# Patient Record
Sex: FEMALE | Race: WHITE | Employment: PART TIME | URBAN - NONMETROPOLITAN AREA
[De-identification: names, ages, dates, MRNs, and addresses within clinical notes are randomized per-mention and may not be internally consistent; named-entity substitution may affect disease eponyms.]

---

## 2017-05-17 DIAGNOSIS — Z12.31 VISIT FOR SCREENING MAMMOGRAM: Primary | ICD-10-CM

## 2017-11-29 ENCOUNTER — HOSPITAL ENCOUNTER (EMERGENCY)
Facility: HOSPITAL | Age: 57
Discharge: HOME | End: 2017-11-29
Attending: FAMILY MEDICINE | Admitting: FAMILY MEDICINE
Payer: COMMERCIAL

## 2017-11-29 VITALS
OXYGEN SATURATION: 99 % | RESPIRATION RATE: 12 BRPM | DIASTOLIC BLOOD PRESSURE: 89 MMHG | TEMPERATURE: 98.7 F | HEIGHT: 62 IN | SYSTOLIC BLOOD PRESSURE: 141 MMHG

## 2017-11-29 DIAGNOSIS — R00.2 PALPITATIONS: ICD-10-CM

## 2017-11-29 LAB
ALBUMIN SERPL-MCNC: 4 G/DL (ref 3.4–5)
ALP SERPL-CCNC: 103 U/L (ref 40–150)
ALT SERPL W P-5'-P-CCNC: 23 U/L (ref 0–50)
ANION GAP SERPL CALCULATED.3IONS-SCNC: 10 MMOL/L (ref 3–14)
AST SERPL W P-5'-P-CCNC: 17 U/L (ref 0–45)
BASOPHILS # BLD AUTO: 0.1 10E9/L (ref 0–0.2)
BASOPHILS NFR BLD AUTO: 0.7 %
BILIRUB SERPL-MCNC: 0.4 MG/DL (ref 0.2–1.3)
BUN SERPL-MCNC: 7 MG/DL (ref 7–30)
CALCIUM SERPL-MCNC: 9.2 MG/DL (ref 8.5–10.1)
CHLORIDE SERPL-SCNC: 99 MMOL/L (ref 94–109)
CO2 SERPL-SCNC: 25 MMOL/L (ref 20–32)
CREAT SERPL-MCNC: 0.75 MG/DL (ref 0.52–1.04)
DIFFERENTIAL METHOD BLD: NORMAL
EOSINOPHIL # BLD AUTO: 0 10E9/L (ref 0–0.7)
EOSINOPHIL NFR BLD AUTO: 0.5 %
ERYTHROCYTE [DISTWIDTH] IN BLOOD BY AUTOMATED COUNT: 12 % (ref 10–15)
GFR SERPL CREATININE-BSD FRML MDRD: 80 ML/MIN/1.7M2
GLUCOSE SERPL-MCNC: 142 MG/DL (ref 70–99)
HCT VFR BLD AUTO: 39.7 % (ref 35–47)
HGB BLD-MCNC: 14.1 G/DL (ref 11.7–15.7)
IMM GRANULOCYTES # BLD: 0 10E9/L (ref 0–0.4)
IMM GRANULOCYTES NFR BLD: 0.3 %
LYMPHOCYTES # BLD AUTO: 1.8 10E9/L (ref 0.8–5.3)
LYMPHOCYTES NFR BLD AUTO: 24.4 %
MAGNESIUM SERPL-MCNC: 2.4 MG/DL (ref 1.6–2.3)
MCH RBC QN AUTO: 29.8 PG (ref 26.5–33)
MCHC RBC AUTO-ENTMCNC: 35.5 G/DL (ref 31.5–36.5)
MCV RBC AUTO: 84 FL (ref 78–100)
MONOCYTES # BLD AUTO: 0.4 10E9/L (ref 0–1.3)
MONOCYTES NFR BLD AUTO: 5.3 %
NEUTROPHILS # BLD AUTO: 5.1 10E9/L (ref 1.6–8.3)
NEUTROPHILS NFR BLD AUTO: 68.8 %
NRBC # BLD AUTO: 0 10*3/UL
NRBC BLD AUTO-RTO: 0 /100
PLATELET # BLD AUTO: 235 10E9/L (ref 150–450)
POTASSIUM SERPL-SCNC: 4 MMOL/L (ref 3.4–5.3)
PROT SERPL-MCNC: 6.9 G/DL (ref 6.8–8.8)
RBC # BLD AUTO: 4.73 10E12/L (ref 3.8–5.2)
SODIUM SERPL-SCNC: 134 MMOL/L (ref 133–144)
TROPONIN I SERPL-MCNC: <0.015 UG/L (ref 0–0.04)
TSH SERPL DL<=0.005 MIU/L-ACNC: 2.62 MU/L (ref 0.4–4)
WBC # BLD AUTO: 7.3 10E9/L (ref 4–11)

## 2017-11-29 PROCEDURE — 93005 ELECTROCARDIOGRAM TRACING: CPT

## 2017-11-29 PROCEDURE — 36415 COLL VENOUS BLD VENIPUNCTURE: CPT | Performed by: FAMILY MEDICINE

## 2017-11-29 PROCEDURE — 93010 ELECTROCARDIOGRAM REPORT: CPT | Performed by: INTERNAL MEDICINE

## 2017-11-29 PROCEDURE — 84443 ASSAY THYROID STIM HORMONE: CPT | Performed by: FAMILY MEDICINE

## 2017-11-29 PROCEDURE — 84484 ASSAY OF TROPONIN QUANT: CPT | Performed by: FAMILY MEDICINE

## 2017-11-29 PROCEDURE — 99285 EMERGENCY DEPT VISIT HI MDM: CPT | Performed by: FAMILY MEDICINE

## 2017-11-29 PROCEDURE — 80053 COMPREHEN METABOLIC PANEL: CPT | Performed by: FAMILY MEDICINE

## 2017-11-29 PROCEDURE — 83735 ASSAY OF MAGNESIUM: CPT | Performed by: FAMILY MEDICINE

## 2017-11-29 PROCEDURE — 85025 COMPLETE CBC W/AUTO DIFF WBC: CPT | Performed by: FAMILY MEDICINE

## 2017-11-29 PROCEDURE — 99284 EMERGENCY DEPT VISIT MOD MDM: CPT

## 2017-11-29 NOTE — ED AVS SNAPSHOT
[facility]    [address]    [place] [address]    Phone:  [phone]                                       Renu Curiel   MRN: [id number]    Department:  [facility]   Date of Visit:  11/29/2017           After Visit Summary Signature Page     I have received my discharge instructions, and my questions have been answered. I have discussed any challenges I see with this plan with the nurse or doctor.    ..........................................................................................................................................  Patient/Patient Representative Signature      ..........................................................................................................................................  Patient Representative Print Name and Relationship to Patient    ..................................................               ................................................  Date                                            Time    ..........................................................................................................................................  Reviewed by Signature/Title    ...................................................              ..............................................  Date                                                            Time

## 2017-11-29 NOTE — ED PROVIDER NOTES
"eMERGENCY dEPARTMENT eNCOUnter        CHIEF COMPLAINT    Chief Complaint   Patient presents with     Tachycardia     Srated at 1030 this am and last approx 10 minutes, \"then came back again when I got up to walk\".     Chest Pain     Left chest pain and jaw pain. \"jaw pain off and on for 2 weeks\". States was started on a new medication yesterday.       HPI    Renu Curiel is a 57 year old female who presents with rapid heart rate and \"feeling weird\" at work today.  She saw Dr. Montoya in clinic yesterday with some mid abdominal pain which has been chronic for her.  She also has a history of chronic left shoulder pain and chest pain.  She has had workup including stress test, MRI, etc.  She was given some bentyl, took it yesterday and today.  She started feeling \"weird\", feeling heavy.  Then heart starting going fast, she doesn't know how fast.  She sat down and rested, then got up to walk again and her heart started racing again.  She called EMS, pre-hospital EKG showed sinus rhythm.  She hands me a written list of \"things that have happened to me\"over the last 8 years.  These include symptoms of: 1. Weird wiggy feeling and strange feeling throughout body 2. Jittery and nervous 3. Anxious 4. Tired - heavy feeling 5. Tingling, digestive issues, and:  lower back pain, chest pain, aches all over, migraines, chills, warm at night, racing heart, swollen eyes, white bumps on hand and feet.  Evaluations include HIDA scans, abdominal ultrasounds, colonoscopies, vaginal US, MRI of head, MRI of heart, stress tests.    REVIEW OF SYSTEMS    Cardiac: no Chest Pain, No syncope, complains of left shoulder pain going up neck and down arm for a couple of years  Respiratory: No cough or hemoptysis  GI: No Vomiting or Diarrhea  : No Dysuria or Hematuria  General: No Fever or Chills  All other systems reviewed and are negative.    PAST MEDICAL & SURGICAL HISTORY    No past medical history on file.  No past surgical history on " "file.    CURRENT MEDICATIONS    Current Outpatient Rx   Medication Sig Dispense Refill     Levothyroxine Sodium (SYNTHROID PO) Take 75 mcg by mouth three times a week       Dicyclomine HCl (BENTYL PO) Take 10 mg by mouth 4 times daily       VITAMIN D, CHOLECALCIFEROL, PO Take 1,000 Units by mouth daily       Levothyroxine Sodium (SYNTHROID PO) Take 88 mcg by mouth four times a week          ALLERGIES    No Known Allergies    SOCIAL & FAMILY HISTORY    Social History     Social History     Marital status:      Spouse name: N/A     Number of children: N/A     Years of education: N/A     Social History Main Topics     Smoking status: Current Every Day Smoker     Packs/day: 0.50     Years: 40.00     Smokeless tobacco: Never Used     Alcohol use Yes     Drug use: No     Sexual activity: Not on file     Other Topics Concern     Not on file     Social History Narrative     No family history on file.    PHYSICAL EXAM    VITAL SIGNS: /89  Temp 98.7  F (37.1  C) (Oral)  Resp 12  Ht 1.575 m (5' 2\")  SpO2 99%   Constitutional:  Well developed, well nourished, no acute distress, she is wearing dark glasses.     HENT:  Atraumatic, moist mucus membranes  Neck: supple, no JVD   Respiratory:  Lungs Clear, no retractions   Cardiovascular:  regular rate, no murmurs  Vascular: Radial pulses 2+ equal bilaterally  GI:  Soft, nontender, normal bowel sounds  Musculoskeletal:  No edema, no acute deformities  Integument:  Skin warm and dry, no petechiae   Neurologic:  Alert & oriented, no slurred speech  Psych: Pleasant affect, no hallucinations    EKG    Interpreted by emergency department physician:  NSR, no ischemic changes        ED COURSE & MEDICAL DECISION MAKING    Pertinent Labs & Imaging studies reviewed and interpreted. (See chart for details)       See chart for details of medications given during the ED stay.    Vitals:    11/29/17 1315 11/29/17 1330 11/29/17 1345 11/29/17 1427   BP: 146/82 136/92 (!) 143/129 " 141/89   Resp:    12   Temp:    98.7  F (37.1  C)   TempSrc:    Oral   SpO2: 98% 100% 98% 99%   Height:             FINAL IMPRESSION    1. Palpitations        Plan: I discussed findings with the patient, she has a normal exam, normal labs, normal EKG.  I have recommended a cardiac monitor and follow up with Dr. Montoya.  She agreed to this but then refused the Zio Patch when they came to fit her.  Will have her follow up with Dr. Montoya, certainly we will need to capture this tachycardia.             Krysten Knutson MD  11/29/17 4898

## 2017-11-29 NOTE — ED AVS SNAPSHOT
" [facility]    [address] [place] [address]    Phone:  [phone]                                       Renu Curiel   MRN: [id number]    Department:  [facility]   Date of Visit:  11/29/2017           Patient Information     Date Of Birth          1960        Your diagnoses for this visit were:     Palpitations        You were seen by Krysten Knutson MD.        Discharge Instructions         * Heart Palpitations    Palpitations refers to the feeling that your heart is beating hard, fast or irregular. Some people describe it as \"pounding\" or \"skipped beats\". Palpitations may occur in persons with heart disease, but can also occur in healthy persons. Your doctor does not believe that anything dangerous is causing your symptoms at this time.  Heart-Related Causes:    Arrhythmia (a change from the heart's normal rhythm)    Disease of the heart valves  Non-Heart-Related Causes:    Certain medicines (such as asthma inhalers and decongestants)    Some herbal supplements, energy drinks and pills, and weight loss pills    Illegal stimulant drugs (such as cocaine, crank, methamphetamine, PCP)    Caffeine, alcohol and tobacco    Medical conditions such as thyroid disease, anemia, anxiety and panic disorder  Sometimes the cause cannot be found.  Home Care:  1. Avoid excess caffeine, alcohol, tobacco and any stimulant drugs.  2. Tell your doctor about any prescription or over-the-counter or herbal medicines you take.  Follow Up  with your doctor or as advised by our staff.  Get Prompt Medical Attention  if any of the following occur together with palpitations:    Weakness, dizziness, light-headed or fainting    Chest pain or shortness of breath    Rapid heart rate (over 120 beats per minute, at rest)    Palpitations that lasts over 20 minutes    Weakness of an arm or leg or one side of the face    Difficulty with speech or vision    8294-6217 [employer] [address] " [place] [address]. All rights reserved. This information is not intended as a substitute for professional medical care. Always follow your healthcare professional's instructions.  This information has been modified by your health care provider with permission from the publisher.          ED Discharge Orders     Zio Patch Holter                    Review of your medicines      Our records show that you are taking the medicines listed below. If these are incorrect, please call your family doctor or clinic.        Dose / Directions Last dose taken    BENTYL PO   Dose:  10 mg        Take 10 mg by mouth 4 times daily   Refills:  0        * SYNTHROID PO   Dose:  88 mcg        Take 88 mcg by mouth four times a week   Refills:  0        * SYNTHROID PO   Dose:  75 mcg        Take 75 mcg by mouth three times a week   Refills:  0        VITAMIN D (CHOLECALCIFEROL) PO   Dose:  1000 Units        Take 1,000 Units by mouth daily   Refills:  0        * Notice:  This list has 2 medication(s) that are the same as other medications prescribed for you. Read the directions carefully, and ask your doctor or other care provider to review them with you.            Procedures and tests performed during your visit     CBC with platelets differential    Comprehensive metabolic panel    EKG 12-lead, tracing only    Magnesium    TSH    Troponin I      Orders Needing Specimen Collection     None      Pending Results     No orders found from 11/27/2017 to 11/30/2017.            Pending Culture Results     No orders found from 11/27/2017 to 11/30/2017.            Thank you for choosing [facility]       Thank you for choosing [facility] for your care. Our goal is always to provide you with excellent care. Hearing back from our patients is one way we can continue to improve our services. Please take a few minutes to complete the written survey that you may receive in the mail after you visit with us. Thank you!        [facility]  "Information     [employer] lets you send messages to your doctor, view your test results, renew your prescriptions, schedule appointments and more. To sign up, go to [email or web address] . Click on \"Log in\" on the left side of the screen, which will take you to the Welcome page. Then click on \"Sign up Now\" on the right side of the page.     You will be asked to enter the access code listed below, as well as some personal information. Please follow the directions to create your username and password.     Your access code is: [email or web address]  Expires: 2018  2:06 PM     Your access code will  in 90 days. If you need help or a new code, please call your [facility] clinic or [phone].        Care EveryWhere ID     This is your Care EveryWhere ID. This could be used by other organizations to access your [facility] medical records  [id number]        Equal Access to Services     AFR[place] REESE AH: Hadii caden Warren, wanathaniel coker, qavenessa kaalmareema almonte, javon montoya . So [facility] [place] [phone].    ATENCIÓN: Si habla español, tiene a weaver disposición servicios gratuitos de asistencia lingüística. Llame al [phone].    We comply with applicable federal civil rights laws and [place] laws. We do not discriminate on the basis of race, color, national origin, age, disability, sex, sexual orientation, or gender identity.            After Visit Summary       This is your record. Keep this with you and show to your community pharmacist(s) and doctor(s) at your next visit.                  "

## 2017-11-29 NOTE — ED NOTES
"Pt brought in by [facility] ambulance. Pt with history of \"feeling like my heart was racing\" Stated episode was fallowed by left chest pain and left jaw pain. Pt was given 1 nitro in route with no change in pain level. Pt also took 4 baby aspirin. States has had off and ion jaw pain for 2 weeks, off and on nausea for 1 week. Denies any diaphoresis or vomiting.  "

## 2017-11-29 NOTE — DISCHARGE INSTRUCTIONS
"  * Heart Palpitations    Palpitations refers to the feeling that your heart is beating hard, fast or irregular. Some people describe it as \"pounding\" or \"skipped beats\". Palpitations may occur in persons with heart disease, but can also occur in healthy persons. Your doctor does not believe that anything dangerous is causing your symptoms at this time.  Heart-Related Causes:    Arrhythmia (a change from the heart's normal rhythm)    Disease of the heart valves  Non-Heart-Related Causes:    Certain medicines (such as asthma inhalers and decongestants)    Some herbal supplements, energy drinks and pills, and weight loss pills    Illegal stimulant drugs (such as cocaine, crank, methamphetamine, PCP)    Caffeine, alcohol and tobacco    Medical conditions such as thyroid disease, anemia, anxiety and panic disorder  Sometimes the cause cannot be found.  Home Care:  1. Avoid excess caffeine, alcohol, tobacco and any stimulant drugs.  2. Tell your doctor about any prescription or over-the-counter or herbal medicines you take.  Follow Up  with your doctor or as advised by our staff.  Get Prompt Medical Attention  if any of the following occur together with palpitations:    Weakness, dizziness, light-headed or fainting    Chest pain or shortness of breath    Rapid heart rate (over 120 beats per minute, at rest)    Palpitations that lasts over 20 minutes    Weakness of an arm or leg or one side of the face    Difficulty with speech or vision    [phone] [employer]. [address] [place] [address]. All rights reserved. This information is not intended as a substitute for professional medical care. Always follow your healthcare professional's instructions.  This information has been modified by your health care provider with permission from the publisher.        "

## 2018-07-31 ENCOUNTER — HOSPITAL ENCOUNTER (OUTPATIENT)
Dept: CT IMAGING | Facility: HOSPITAL | Age: 58
Discharge: HOME | End: 2018-07-31
Attending: FAMILY MEDICINE | Admitting: FAMILY MEDICINE
Payer: COMMERCIAL

## 2018-07-31 DIAGNOSIS — R10.30 LOWER ABDOMINAL PAIN: ICD-10-CM

## 2018-07-31 DIAGNOSIS — F17.200 TOBACCO DEPENDENCE: ICD-10-CM

## 2018-07-31 PROCEDURE — 74174 CTA ABD&PLVS W/CONTRAST: CPT | Mod: TC

## 2018-07-31 PROCEDURE — 25000128 H RX IP 250 OP 636: Performed by: RADIOLOGY

## 2018-07-31 RX ORDER — IOPAMIDOL 755 MG/ML
50 INJECTION, SOLUTION INTRAVASCULAR ONCE
Status: COMPLETED | OUTPATIENT
Start: 2018-07-31 | End: 2018-07-31

## 2018-07-31 RX ADMIN — IOPAMIDOL 50 ML: 755 INJECTION, SOLUTION INTRAVENOUS at 08:20

## 2018-07-31 RX ADMIN — IOPAMIDOL 50 ML: 755 INJECTION, SOLUTION INTRAVENOUS at 08:19

## 2018-08-08 ENCOUNTER — TRANSFERRED RECORDS (OUTPATIENT)
Dept: HEALTH INFORMATION MANAGEMENT | Facility: CLINIC | Age: 58
End: 2018-08-08

## 2018-08-09 ENCOUNTER — HOSPITAL ENCOUNTER (EMERGENCY)
Facility: HOSPITAL | Age: 58
Discharge: HOME | End: 2018-08-09
Attending: FAMILY MEDICINE | Admitting: FAMILY MEDICINE
Payer: COMMERCIAL

## 2018-08-09 VITALS
DIASTOLIC BLOOD PRESSURE: 95 MMHG | RESPIRATION RATE: 16 BRPM | OXYGEN SATURATION: 99 % | HEART RATE: 61 BPM | TEMPERATURE: 97.7 F | SYSTOLIC BLOOD PRESSURE: 147 MMHG

## 2018-08-09 DIAGNOSIS — R10.31 ABDOMINAL PAIN, RIGHT LOWER QUADRANT: ICD-10-CM

## 2018-08-09 PROCEDURE — 99283 EMERGENCY DEPT VISIT LOW MDM: CPT

## 2018-08-09 PROCEDURE — 25000132 ZZH RX MED GY IP 250 OP 250 PS 637: Performed by: FAMILY MEDICINE

## 2018-08-09 PROCEDURE — 99283 EMERGENCY DEPT VISIT LOW MDM: CPT | Performed by: FAMILY MEDICINE

## 2018-08-09 PROCEDURE — G0463 HOSPITAL OUTPT CLINIC VISIT: HCPCS

## 2018-08-09 RX ORDER — MOMETASONE FUROATE 1 MG/G
1 OINTMENT TOPICAL DAILY PRN
COMMUNITY
Start: 2018-05-07 | End: 2020-05-19

## 2018-08-09 RX ORDER — ALPRAZOLAM 0.25 MG
0.25 TABLET ORAL 3 TIMES DAILY PRN
COMMUNITY
Start: 2018-07-24

## 2018-08-09 RX ORDER — CLOBETASOL PROPIONATE 0.5 MG/G
OINTMENT TOPICAL
COMMUNITY
Start: 2017-12-19 | End: 2020-05-19

## 2018-08-09 RX ORDER — OXYCODONE HYDROCHLORIDE 5 MG/1
5 TABLET ORAL ONCE
Status: COMPLETED | OUTPATIENT
Start: 2018-08-09 | End: 2018-08-09

## 2018-08-09 RX ADMIN — OXYCODONE HYDROCHLORIDE 5 MG: 5 TABLET ORAL at 10:58

## 2018-08-09 NOTE — ED TRIAGE NOTES
"Patient presents with complaints of RLQ pain.  States she had a CT a week ago and she has an Ovarian cyst.  Patient states the pain is the \"same\" only \"worse\" today.  "

## 2018-08-09 NOTE — DISCHARGE INSTRUCTIONS

## 2018-08-09 NOTE — ED NOTES
"Pt ambulatory to room 5 of the ED with . Pt reports that she has been having RLQ on and off for 3-4 years but more again since beginning of July. Pt has been going to the doctor for this matter and has a \"tumor\" on her pancreas and a \"possible cyst\" on her right ovary. For the last two days pt has been having increased RLQ pain rating it intermittently 9-10/10 mostly \"burning.\" Pt is moving freely around the room. Pt up to the bathroom to provide urine sample and gown.   "

## 2018-08-09 NOTE — ED PROVIDER NOTES
History     Chief Complaint   Patient presents with     Abdominal Pain     RLQ     HPI  Renu Curiel is a 58 year old female who is having significant anxiety and pain in her right lower quadrant.  She states the pain radiates up to her rib cage and down to her pelvis, she indicates that it is deep in her pelvis, and is constantly poking and rubbing on it.  She has high anxiety at this time because she was told that she has a pancreatic mass, she then had a follow-up MRI at [facility] yesterday which said that there is no abnormal pancreatic mass.  I informed her of this and she stated she was much relieved.  She is still concerned about the pain, however there is nothing on the MRI that indicates why she is having it.  The distribution suggests muscle, but there is no proof that that is what is going on.    Problem List:    There are no active problems to display for this patient.       Past Medical History:    History reviewed. No pertinent past medical history.    Past Surgical History:    History reviewed. No pertinent surgical history.    Family History:    No family history on file.    Social History:  Marital Status:   [2]  Social History   Substance Use Topics     Smoking status: Current Every Day Smoker     Packs/day: 0.50     Years: 40.00     Smokeless tobacco: Never Used     Alcohol use Yes        Medications:      ALPRAZolam (XANAX) 0.25 MG tablet   clobetasol (TEMOVATE) 0.05 % ointment   Ibuprofen (ADVIL PO)   Levothyroxine Sodium (SYNTHROID PO)   mometasone (ELOCON) 0.1 % ointment   VITAMIN D, CHOLECALCIFEROL, PO   [DISCONTINUED] Levothyroxine Sodium (SYNTHROID PO)         Review of Systems   Constitutional: Positive for activity change. Negative for diaphoresis, fatigue and fever.   HENT: Negative.    Respiratory: Negative for shortness of breath.    Cardiovascular: Negative for chest pain.   Gastrointestinal: Positive for abdominal pain.        From costal margin to iliac crest.    Genitourinary: Negative.    Musculoskeletal: Negative for arthralgias and myalgias.   Skin: Negative.    Neurological: Negative.    Psychiatric/Behavioral: Negative.        Physical Exam   BP: 153/88  Heart Rate: 65  Temp: 97.7  F (36.5  C)  Resp: 16      Physical Exam   Constitutional: She is oriented to person, place, and time. She appears well-developed and well-nourished. She appears distressed.   Emotionally rather than physically distressed.   Neck: Normal range of motion. Neck supple.   Cardiovascular: Normal rate, regular rhythm, normal heart sounds and intact distal pulses.    No murmur heard.  Pulmonary/Chest: Effort normal and breath sounds normal. No respiratory distress. She has no wheezes.   Abdominal: Soft. Bowel sounds are normal. She exhibits no distension. There is no tenderness.   Musculoskeletal: Normal range of motion. She exhibits no edema.   Neurological: She is alert and oriented to person, place, and time.   Skin: Skin is warm and dry.   Psychiatric: Her behavior is normal. Her mood appears anxious. Her speech is rapid and/or pressured. Cognition and memory are normal.   Nursing note and vitals reviewed.      ED Course     ED Course     Procedures    No results found for this or any previous visit (from the past 24 hour(s)).    Medications   oxyCODONE IR (ROXICODONE) tablet 5 mg (5 mg Oral Given 8/9/18 1058)       Assessments & Plan (with Medical Decision Making)   Patient was given oxycodone 5 mg which did improve her pain significantly.  Advised her to alternate between her oxycodone which she has at home and some ibuprofen for inflammation if that is part of the process.  Did inform her that there is no fluid in the cul-de-sac that is recognized in either the MRI of the CT, so this ovarian cyst is unlikely to be the cause of her pain.  She will follow-up with Dr. Montoya next week.  No new meds.    I have reviewed the nursing notes.    I have reviewed the findings, diagnosis, plan and  need for follow up with the patient.  New Prescriptions    No medications on file       Final diagnoses:   Abdominal pain, right lower quadrant       8/9/2018   [facility]     Roxy Okeefe MD  08/12/18 5745

## 2018-08-09 NOTE — ED AVS SNAPSHOT
[facility]    [address]    [place] [address]    Phone:  [phone]                                       Renu Curiel   MRN: [id number]    Department[facility]  [facility]   Date of Visit:  8/9/2018           After Visit Summary Signature Page     I have received my discharge instructions, and my questions have been answered. I have discussed any challenges I see with this plan with the nurse or doctor.    ..........................................................................................................................................  Patient/Patient Representative Signature      ..........................................................................................................................................  Patient Representative Print Name and Relationship to Patient    ..................................................               ................................................  Date                                            Time    ..........................................................................................................................................  Reviewed by Signature/Title    ...................................................              ..............................................  Date                                                            Time

## 2018-08-09 NOTE — ED NOTES
"Pt had recent left shoulder surgery in may and reports that she is having some \"tingling\" in the left arm. Pt reports she gets that sometimes with anxiety but wanted to let the MD know.  "

## 2018-08-09 NOTE — ED AVS SNAPSHOT
[facility]    [address] [address]    Phone:  [phone]                                       Renu Curiel   MRN: [id number]    Department:  [facility]   Date of Visit:  8/9/2018           Patient Information     Date Of Birth          1960        Your diagnoses for this visit were:     Abdominal pain, right lower quadrant        You were seen by Roxy Okeefe MD.      Follow-up Information     Follow up with Gallo Montoya MD In 1 week.    Specialty:  Family Practice    Why:  Follow up ED visit    Contact information:    [facility]  [address] [place] [address]  [phone]          Discharge Instructions         Abdominal Pain    Abdominal pain is pain in the stomach or belly area. Everyone has this pain from time to time. In many cases it goes away on its own. But abdominal pain can sometimes be due to a serious problem, such as appendicitis. So it s important to know when to seek help.  Causes of abdominal pain  There are many possible causes of abdominal pain. Common causes in adults include:    Constipation, diarrhea, or gas    Stomach acid flowing back up into the esophagus (acid reflux or heartburn)    Severe acid reflux, called GERD (gastroesophageal reflux disease)    A sore in the lining of the stomach or small intestine (peptic ulcer)    Inflammation of the gallbladder, liver, or pancreas    Gallstones or kidney stones    Appendicitis     Intestinal blockage     An internal organ pushing through a muscle or other tissue (hernia)    Urinary tract infections    In women, menstrual cramps, fibroids, or endometriosis    Inflammation or infection of the intestines  Diagnosing the cause of abdominal pain  Your healthcare provider will do a physical exam help find the cause of your pain. If needed, tests will be ordered. Belly pain has many possible causes. So it can be hard to find the reason for your pain. Giving details  about your pain can help. Tell your provider where and when you feel the pain, and what makes it better or worse. Also let your provider know if you have other symptoms such as:    Fever    Tiredness    Upset stomach (nausea)    Vomiting    Changes in bathroom habits  Treating abdominal pain  Some causes of pain need emergency medical treatment right away. These include appendicitis or a bowel blockage. Other problems can be treated with rest, fluids, or medicines. Your healthcare provider can give you specific instructions for treatment or self-care based on what is causing your pain.  If you have vomiting or diarrhea, sip water or other clear fluids. When you are ready to eat solid foods again, start with small amounts of easy-to-digest, low-fat foods. These include apple sauce, toast, or crackers.   When to seek medical care  Call 911 or go to the hospital right away if you:    Can t pass stool and are vomiting    Are vomiting blood or have bloody diarrhea or black, tarry diarrhea    Have chest, neck, or shoulder pain    Feel like you might pass out    Have pain in your shoulder blades with nausea    Have sudden, severe belly pain    Have new, severe pain unlike any you have felt before    Have a belly that is rigid, hard, and tender to touch  Call your healthcare provider if you have:    Pain for more than 5 days    Bloating for more than 2 days    Diarrhea for more than 5 days    A fever of 100.4 F (38 C) or higher, or as directed by your healthcare provider    Pain that gets worse    Weight loss for no reason    Continued lack of appetite    Blood in your stool  How to prevent abdominal pain  Here are some tips to help prevent abdominal pain:    Eat smaller amounts of food at one time.    Avoid greasy, fried, or other high-fat foods.    Avoid foods that give you gas.    Exercise regularly.    Drink plenty of fluids.  To help prevent GERD symptoms:    Quit smoking.    Reduce alcohol and certain foods that  increase stomach acid.    Avoid aspirin and over-the-counter pain and fever medicines (NSAIDS or nonsteroidal anti-inflammatory drugs), if possible    Lose extra weight.    Finish eating at least 2 hours before you go to bed or lie down.    Raise the head of your bed.  Date Last Reviewed: 7/1/2016 2000-2017 [employer]. [address] [place] [address], [place] [address]. All rights reserved. This information is not intended as a substitute for professional medical care. Always follow your healthcare professional's instructions.             Review of your medicines      Our records show that you are taking the medicines listed below. If these are incorrect, please call your family doctor or clinic.        Dose / Directions Last dose taken    ADVIL PO   Dose:  600 mg        Take 600 mg by mouth every 8 hours as needed for moderate pain   Refills:  0        ALPRAZolam 0.25 MG tablet   Commonly known as:  XANAX   Dose:  0.25 mg        Take 0.25 mg by mouth as needed   Refills:  0        clobetasol 0.05 % ointment   Commonly known as:  TEMOVATE        Apply the smallest amount that will cover affected area.  Apply 1-2 x weekly and when having burning/itching.   Refills:  0        mometasone 0.1 % ointment   Commonly known as:  ELOCON        Apply topically as needed   Refills:  0        SYNTHROID PO   Dose:  88 mcg        Take 88 mcg by mouth four times a week   Refills:  0        VITAMIN D (CHOLECALCIFEROL) PO   Dose:  1000 Units        Take 1,000 Units by mouth daily   Refills:  0                Orders Needing Specimen Collection     None      Pending Results     No orders found from 8/7/2018 to 8/10/2018.            Pending Culture Results     No orders found from 8/7/2018 to 8/10/2018.            Thank you for choosing [facility]       Thank you for choosing [facility] for your care. Our goal is always to provide you with excellent care. Hearing back from our patients is one way we can continue to improve our  services. Please take a few minutes to complete the written survey that you may receive in the mail after you visit with us. Thank you!        Care EveryWhere ID     This is your Care EveryWhere ID. This could be used by other organizations to access your [facility] records  [id number]        Equal Access to Services     AFR[place] REESE : Suhail Warren, shai coker, jack muñozaljosh almonte, javon velez. So [facility] [phone].    ATENCIÓN: Si habla español, tiene a weaver disposición servicios gratuitos de asistencia lingüística. Llame al [phone].    We comply with applicable federal civil rights laws and [place] laws. We do not discriminate on the basis of race, color, national origin, age, disability, sex, sexual orientation, or gender identity.            After Visit Summary       This is your record. Keep this with you and show to your community pharmacist(s) and doctor(s) at your next visit.

## 2018-08-12 ASSESSMENT — ENCOUNTER SYMPTOMS
DIAPHORESIS: 0
FEVER: 0
ABDOMINAL PAIN: 1
FATIGUE: 0
PSYCHIATRIC NEGATIVE: 1
MYALGIAS: 0
ARTHRALGIAS: 0
SHORTNESS OF BREATH: 0
ACTIVITY CHANGE: 1
NEUROLOGICAL NEGATIVE: 1

## 2019-03-31 ENCOUNTER — HOSPITAL ENCOUNTER (EMERGENCY)
Facility: HOSPITAL | Age: 59
Discharge: HOME | End: 2019-03-31
Attending: EMERGENCY MEDICINE | Admitting: EMERGENCY MEDICINE
Payer: COMMERCIAL

## 2019-03-31 VITALS
OXYGEN SATURATION: 100 % | SYSTOLIC BLOOD PRESSURE: 112 MMHG | RESPIRATION RATE: 16 BRPM | TEMPERATURE: 97.8 F | DIASTOLIC BLOOD PRESSURE: 76 MMHG

## 2019-03-31 DIAGNOSIS — N30.01 ACUTE CYSTITIS WITH HEMATURIA: ICD-10-CM

## 2019-03-31 DIAGNOSIS — B96.89 BACTERIAL VAGINOSIS: Primary | ICD-10-CM

## 2019-03-31 DIAGNOSIS — N76.0 BACTERIAL VAGINOSIS: Primary | ICD-10-CM

## 2019-03-31 LAB
ALBUMIN UR-MCNC: 10 MG/DL
APPEARANCE UR: ABNORMAL
BACTERIA #/AREA URNS HPF: ABNORMAL /HPF
BILIRUB UR QL STRIP: NEGATIVE
C TRACH DNA SPEC QL PROBE+SIG AMP: NOT DETECTED
COLOR UR AUTO: ABNORMAL
GLUCOSE UR STRIP-MCNC: NEGATIVE MG/DL
HGB UR QL STRIP: NEGATIVE
KETONES UR STRIP-MCNC: NEGATIVE MG/DL
LEUKOCYTE ESTERASE UR QL STRIP: NEGATIVE
MUCOUS THREADS #/AREA URNS LPF: PRESENT /LPF
N GONORRHOEA DNA SPEC QL PROBE+SIG AMP: NOT DETECTED
NITRATE UR QL: NEGATIVE
PH UR STRIP: 6 PH (ref 4.7–8)
RBC #/AREA URNS AUTO: 0 /HPF (ref 0–2)
SOURCE: ABNORMAL
SP GR UR STRIP: 1.01 (ref 1–1.03)
SPECIMEN SOURCE: ABNORMAL
SPECIMEN SOURCE: NORMAL
SQUAMOUS #/AREA URNS AUTO: 13 /HPF (ref 0–1)
UROBILINOGEN UR STRIP-MCNC: NORMAL MG/DL (ref 0–2)
WBC #/AREA URNS AUTO: 2 /HPF (ref 0–5)
WET PREP SPEC: ABNORMAL

## 2019-03-31 PROCEDURE — 81001 URINALYSIS AUTO W/SCOPE: CPT | Performed by: EMERGENCY MEDICINE

## 2019-03-31 PROCEDURE — 87491 CHLMYD TRACH DNA AMP PROBE: CPT | Performed by: EMERGENCY MEDICINE

## 2019-03-31 PROCEDURE — 87210 SMEAR WET MOUNT SALINE/INK: CPT | Performed by: EMERGENCY MEDICINE

## 2019-03-31 PROCEDURE — 87591 N.GONORRHOEAE DNA AMP PROB: CPT | Performed by: EMERGENCY MEDICINE

## 2019-03-31 PROCEDURE — 99284 EMERGENCY DEPT VISIT MOD MDM: CPT | Mod: Z6 | Performed by: EMERGENCY MEDICINE

## 2019-03-31 PROCEDURE — 99283 EMERGENCY DEPT VISIT LOW MDM: CPT

## 2019-03-31 RX ORDER — METRONIDAZOLE 7.5 MG/G
1 GEL VAGINAL DAILY
Qty: 70 G | Refills: 0 | Status: SHIPPED | OUTPATIENT
Start: 2019-03-31 | End: 2019-04-05

## 2019-03-31 RX ORDER — SULFAMETHOXAZOLE/TRIMETHOPRIM 800-160 MG
1 TABLET ORAL 2 TIMES DAILY
Qty: 14 TABLET | Refills: 0 | Status: SHIPPED | OUTPATIENT
Start: 2019-03-31 | End: 2019-04-07

## 2019-03-31 ASSESSMENT — ENCOUNTER SYMPTOMS
ABDOMINAL PAIN: 0
FEVER: 0
SHORTNESS OF BREATH: 0

## 2019-03-31 NOTE — ED AVS SNAPSHOT
[facility]  [address]  [place] [address]  Phone:  [phone]                                    Renu Curiel   MRN: [id number]    Department:  [facility]   Date of Visit:  3/31/2019           After Visit Summary Signature Page    I have received my discharge instructions, and my questions have been answered. I have discussed any challenges I see with this plan with the nurse or doctor.    ..........................................................................................................................................  Patient/Patient Representative Signature      ..........................................................................................................................................  Patient Representative Print Name and Relationship to Patient    ..................................................               ................................................  Date                                   Time    ..........................................................................................................................................  Reviewed by Signature/Title    ...................................................              ..............................................  Date                                               Time          22EPIC Rev 08/18

## 2019-03-31 NOTE — ED PROVIDER NOTES
History     Chief Complaint   Patient presents with     Post-op Problem     multiple complaints 10+     HPI  Renu Curiel is a 59 year old female who presented to the emergency department with a 2-day history of urine frequency, painful urination, low back pain and vaginal discharge.  Patient had rectocele repair done at [place] [facility] on Wednesday last week and had been doing well until yesterday when she started noticing the symptoms.  She also has mild suprapubic abdominal discomfort but no fever chills or diarrhea.  Denies any blood in the urine,  Fever, perineal numbness or pain radiation to the legs.    Allergies:  No Known Allergies    Problem List:    There are no active problems to display for this patient.       Past Medical History:    No past medical history on file.    Past Surgical History:    No past surgical history on file.    Family History:    No family history on file.    Social History:  Marital Status:   [2]  Social History     Tobacco Use     Smoking status: Current Every Day Smoker     Packs/day: 0.50     Years: 40.00     Pack years: 20.00     Smokeless tobacco: Never Used   Substance Use Topics     Alcohol use: Yes     Drug use: No        Medications:      ALPRAZolam (XANAX) 0.25 MG tablet   clobetasol (TEMOVATE) 0.05 % ointment   Ibuprofen (ADVIL PO)   Levothyroxine Sodium (SYNTHROID PO)   metroNIDAZOLE (METROGEL) 0.75 % vaginal gel   mometasone (ELOCON) 0.1 % ointment   omeprazole (PRILOSEC) 20 MG DR capsule   sulfamethoxazole-trimethoprim (BACTRIM DS) 800-160 MG tablet   VITAMIN D, CHOLECALCIFEROL, PO         Review of Systems   Constitutional: Negative for fever.   Respiratory: Negative for shortness of breath.    Cardiovascular: Negative for chest pain.   Gastrointestinal: Negative for abdominal pain.   All other systems reviewed and are negative.      Physical Exam   BP: 112/76  Heart Rate: 76  Temp: 97.8  F (36.6  C)  Resp: 16  SpO2: 100 %      Physical Exam   Constitutional: She  appears well-developed and well-nourished. No distress.   HENT:   Head: Normocephalic and atraumatic.   Eyes: EOM are normal. Pupils are equal, round, and reactive to light.   Cardiovascular: Normal rate, regular rhythm, normal heart sounds and intact distal pulses.   Pulmonary/Chest: Effort normal and breath sounds normal. No stridor. No respiratory distress.   Abdominal: Soft. Bowel sounds are normal. She exhibits no distension. There is no tenderness.   Neurological: She is alert.   Skin: She is not diaphoretic.   Nursing note and vitals reviewed.      ED Course        Procedures       Results for orders placed or performed during the hospital encounter of 03/31/19 (from the past 24 hour(s))   UA reflex to Microscopic and Culture   Result Value Ref Range    Color Urine Light Yellow     Appearance Urine Slightly Cloudy     Glucose Urine Negative NEG^Negative mg/dL    Bilirubin Urine Negative NEG^Negative    Ketones Urine Negative NEG^Negative mg/dL    Specific Gravity Urine 1.015 1.003 - 1.035    Blood Urine Negative NEG^Negative    pH Urine 6.0 4.7 - 8.0 pH    Protein Albumin Urine 10 (A) NEG^Negative mg/dL    Urobilinogen mg/dL Normal 0.0 - 2.0 mg/dL    Nitrite Urine Negative NEG^Negative    Leukocyte Esterase Urine Negative NEG^Negative    Source Unspecified Urine     RBC Urine 0 0 - 2 /HPF    WBC Urine 2 0 - 5 /HPF    Bacteria Urine Few (A) NEG^Negative /HPF    Squamous Epithelial /HPF Urine 13 (H) 0 - 1 /HPF    Mucous Urine Present (A) NEG^Negative /LPF   GC/Chlamydia by PCR - [facility]   Result Value Ref Range    Specimen Source Cervical     Neisseria gonorrhoreae PCR Not Detected NDET^Not Detected    Chlamydia Trachomatis PCR Not Detected NDET^Not Detected   Wet prep   Result Value Ref Range    Specimen Description Vagina     Wet Prep WBC'S seen  Few       Wet Prep Clue cells seen (A)     Wet Prep No yeast seen     Wet Prep No Trichomonas seen        Medications - No data to display    Assessments & Plan (with  Medical Decision Making)   Acute cystitis with hematuria: Started on Bactrim DS at the ED and discharged home on the same.  Encouraged to push fluids.  Bacterial vaginosis: Started on metronidazole gel for the next 5 days.  Above diagnoses were discussed with the patient including treatment and side effect profile of medication.  Written and verbal discharge instructions given.  Discharged home in stable condition and will follow up with PCP as outpatient.    I have reviewed the nursing notes.    I have reviewed the findings, diagnosis, plan and need for follow up with the patient.       Medication List      Started    metroNIDAZOLE 0.75 % vaginal gel  Commonly known as:  METROGEL  1 applicator, Vaginal, DAILY     sulfamethoxazole-trimethoprim 800-160 MG tablet  Commonly known as:  BACTRIM DS  1 tablet, Oral, 2 TIMES DAILY            Final diagnoses:   Bacterial vaginosis   Acute cystitis with hematuria       3/31/2019   [facility]     Lenard Pablo MD  03/31/19 [id number]

## 2019-03-31 NOTE — ED NOTES
Discharge instructions reviewed with patient.  Encouraged to return with new or worsening symptoms. 2 Rx's given and will fill at pharmacy of choice.  Copy of AVS given.  No questions or concerns. Pt declined discharge vitals.

## 2019-04-02 ENCOUNTER — HOSPITAL ENCOUNTER (EMERGENCY)
Facility: HOSPITAL | Age: 59
Discharge: HOME | End: 2019-04-02
Attending: PHYSICIAN ASSISTANT | Admitting: PHYSICIAN ASSISTANT
Payer: COMMERCIAL

## 2019-04-02 ENCOUNTER — APPOINTMENT (OUTPATIENT)
Dept: CT IMAGING | Facility: HOSPITAL | Age: 59
End: 2019-04-02
Attending: PHYSICIAN ASSISTANT
Payer: COMMERCIAL

## 2019-04-02 VITALS
DIASTOLIC BLOOD PRESSURE: 96 MMHG | OXYGEN SATURATION: 100 % | WEIGHT: 105 LBS | SYSTOLIC BLOOD PRESSURE: 158 MMHG | RESPIRATION RATE: 16 BRPM | HEIGHT: 62 IN | TEMPERATURE: 97.8 F | BODY MASS INDEX: 19.32 KG/M2

## 2019-04-02 DIAGNOSIS — K60.40 RECTAL FISTULA: ICD-10-CM

## 2019-04-02 DIAGNOSIS — R10.84 ABDOMINAL PAIN, GENERALIZED: ICD-10-CM

## 2019-04-02 LAB
ALBUMIN SERPL-MCNC: 3.9 G/DL (ref 3.4–5)
ALP SERPL-CCNC: 96 U/L (ref 40–150)
ALT SERPL W P-5'-P-CCNC: 24 U/L (ref 0–50)
ANION GAP SERPL CALCULATED.3IONS-SCNC: 9 MMOL/L (ref 3–14)
AST SERPL W P-5'-P-CCNC: 17 U/L (ref 0–45)
BASOPHILS # BLD AUTO: 0 10E9/L (ref 0–0.2)
BASOPHILS NFR BLD AUTO: 0.6 %
BILIRUB SERPL-MCNC: 0.3 MG/DL (ref 0.2–1.3)
BUN SERPL-MCNC: 6 MG/DL (ref 7–30)
CALCIUM SERPL-MCNC: 9.9 MG/DL (ref 8.5–10.1)
CHLORIDE SERPL-SCNC: 93 MMOL/L (ref 94–109)
CO2 SERPL-SCNC: 26 MMOL/L (ref 20–32)
CREAT SERPL-MCNC: 0.87 MG/DL (ref 0.52–1.04)
DIFFERENTIAL METHOD BLD: NORMAL
EOSINOPHIL # BLD AUTO: 0 10E9/L (ref 0–0.7)
EOSINOPHIL NFR BLD AUTO: 0.6 %
ERYTHROCYTE [DISTWIDTH] IN BLOOD BY AUTOMATED COUNT: 12 % (ref 10–15)
GFR SERPL CREATININE-BSD FRML MDRD: 73 ML/MIN/{1.73_M2}
GLUCOSE SERPL-MCNC: 101 MG/DL (ref 70–99)
HCT VFR BLD AUTO: 36.1 % (ref 35–47)
HGB BLD-MCNC: 13 G/DL (ref 11.7–15.7)
IMM GRANULOCYTES # BLD: 0 10E9/L (ref 0–0.4)
IMM GRANULOCYTES NFR BLD: 0.2 %
LIPASE SERPL-CCNC: 122 U/L (ref 73–393)
LYMPHOCYTES # BLD AUTO: 1.3 10E9/L (ref 0.8–5.3)
LYMPHOCYTES NFR BLD AUTO: 19.2 %
MCH RBC QN AUTO: 29.1 PG (ref 26.5–33)
MCHC RBC AUTO-ENTMCNC: 36 G/DL (ref 31.5–36.5)
MCV RBC AUTO: 81 FL (ref 78–100)
MONOCYTES # BLD AUTO: 0.6 10E9/L (ref 0–1.3)
MONOCYTES NFR BLD AUTO: 8.3 %
NEUTROPHILS # BLD AUTO: 4.7 10E9/L (ref 1.6–8.3)
NEUTROPHILS NFR BLD AUTO: 71.1 %
NRBC # BLD AUTO: 0 10*3/UL
NRBC BLD AUTO-RTO: 0 /100
PLATELET # BLD AUTO: 233 10E9/L (ref 150–450)
POTASSIUM SERPL-SCNC: 3.8 MMOL/L (ref 3.4–5.3)
PROT SERPL-MCNC: 7.2 G/DL (ref 6.8–8.8)
RBC # BLD AUTO: 4.47 10E12/L (ref 3.8–5.2)
SODIUM SERPL-SCNC: 128 MMOL/L (ref 133–144)
WBC # BLD AUTO: 6.7 10E9/L (ref 4–11)

## 2019-04-02 PROCEDURE — 74177 CT ABD & PELVIS W/CONTRAST: CPT | Mod: TC

## 2019-04-02 PROCEDURE — 80053 COMPREHEN METABOLIC PANEL: CPT | Performed by: PHYSICIAN ASSISTANT

## 2019-04-02 PROCEDURE — 99285 EMERGENCY DEPT VISIT HI MDM: CPT | Mod: 25

## 2019-04-02 PROCEDURE — 99285 EMERGENCY DEPT VISIT HI MDM: CPT | Mod: Z6 | Performed by: PHYSICIAN ASSISTANT

## 2019-04-02 PROCEDURE — 96360 HYDRATION IV INFUSION INIT: CPT | Mod: XU

## 2019-04-02 PROCEDURE — 85025 COMPLETE CBC W/AUTO DIFF WBC: CPT | Performed by: PHYSICIAN ASSISTANT

## 2019-04-02 PROCEDURE — 83690 ASSAY OF LIPASE: CPT | Performed by: PHYSICIAN ASSISTANT

## 2019-04-02 PROCEDURE — 25000128 H RX IP 250 OP 636: Performed by: PHYSICIAN ASSISTANT

## 2019-04-02 PROCEDURE — 25500064 ZZH RX 255 OP 636: Performed by: PHYSICIAN ASSISTANT

## 2019-04-02 PROCEDURE — 36415 COLL VENOUS BLD VENIPUNCTURE: CPT | Performed by: PHYSICIAN ASSISTANT

## 2019-04-02 RX ORDER — CIPROFLOXACIN 500 MG/1
500 TABLET, FILM COATED ORAL 2 TIMES DAILY
Qty: 14 TABLET | Refills: 0 | Status: SHIPPED | OUTPATIENT
Start: 2019-04-02 | End: 2019-04-09

## 2019-04-02 RX ORDER — CIPROFLOXACIN 500 MG/1
500 TABLET, FILM COATED ORAL ONCE
Status: DISCONTINUED | OUTPATIENT
Start: 2019-04-02 | End: 2019-04-02 | Stop reason: HOSPADM

## 2019-04-02 RX ORDER — IOPAMIDOL 612 MG/ML
100 INJECTION, SOLUTION INTRAVASCULAR ONCE
Status: COMPLETED | OUTPATIENT
Start: 2019-04-02 | End: 2019-04-02

## 2019-04-02 RX ORDER — SODIUM CHLORIDE 9 MG/ML
1000 INJECTION, SOLUTION INTRAVENOUS CONTINUOUS
Status: DISCONTINUED | OUTPATIENT
Start: 2019-04-02 | End: 2019-04-02 | Stop reason: HOSPADM

## 2019-04-02 RX ADMIN — IOPAMIDOL 100 ML: 612 INJECTION, SOLUTION INTRAVENOUS at 19:03

## 2019-04-02 RX ADMIN — SODIUM CHLORIDE 1000 ML: 9 INJECTION, SOLUTION INTRAVENOUS at 18:44

## 2019-04-02 RX ADMIN — SODIUM CHLORIDE 1000 ML: 9 INJECTION, SOLUTION INTRAVENOUS at 19:50

## 2019-04-02 SDOH — HEALTH STABILITY: MENTAL HEALTH: HOW OFTEN DO YOU HAVE A DRINK CONTAINING ALCOHOL?: NEVER

## 2019-04-02 ASSESSMENT — MIFFLIN-ST. JEOR: SCORE: 1004.53

## 2019-04-02 NOTE — ED AVS SNAPSHOT
[facility]  [address]  [place] [address]  Phone:  [phone]                                    Renu Curiel   MRN: [id number]    Department:  [facility]   Date of Visit:  4/2/2019           After Visit Summary Signature Page    I have received my discharge instructions, and my questions have been answered. I have discussed any challenges I see with this plan with the nurse or doctor.    ..........................................................................................................................................  Patient/Patient Representative Signature      ..........................................................................................................................................  Patient Representative Print Name and Relationship to Patient    ..................................................               ................................................  Date                                   Time    ..........................................................................................................................................  Reviewed by Signature/Title    ...................................................              ..............................................  Date                                               Time          22EPIC Rev 08/18

## 2019-04-02 NOTE — ED TRIAGE NOTES
"Patient presents with complaints of not feeling better.  States she had a rectocele procedure done on 3/27/19 by Dr. Malik at [facility].  States on Friday started to feel like she couldn't urinate.  Symptoms worsened and was seen in the ED on Sunday; was diagnosed with an UTI and bacterial vaginosis.  Patient states she has been taking her ABX; had a urine done at the clinic today which was \"negative\" and still just doesn't feel good.  c/o feeling chilled, lower abdominal pain, back pain and nausea.   "

## 2019-04-02 NOTE — ED NOTES
Ambulated to room 2 independently, gown placed, warm blanket, call light in reach.  Nausea, low back /  Bilateral flank pain, abd pain, chills, body aches, and hands and feet will not warm up. No vomiting.  C/o vaginal itching and burning.  Seen in ED on 3/31, prescribed Bactrim and Metronidazole gel.  Rectocele repair at [facility] on 3/27.  Rates pain at 5, describes pain as constant sharp pain in lower back and intermittent sharp abd pain.  Pain goal is zero.  Ibuprofen 600 mg at 0800.  UA done today at [facility], patient reports results WNL.  Advised to come to UC if not feeling well.

## 2019-04-03 NOTE — ED PROVIDER NOTES
History     Chief Complaint   Patient presents with     Abdominal Pain     Back Pain     HPI  Renu Curiel is a 59 year old female who presents to the emergency department with complaints of abdominal pain and back pain that she describes as sharp 5 out of 10 on the right radiating from the right lower quadrant toward the back.  Patient had a rectocele repair on Thursday and was seen in the emergency department on  for similar symptoms.  She was started on Bactrim for her cystitis and metronidazole for bacterial vaginosis.  Symptoms were better on Monday worsened again today.  She reports having nausea and chills but denies any fever she has had minimal blood in the stool which was anticipated following the rectocele repair.  Patient has a surgical history significant for a bladder sling, appendicitis, right oophorectomy, tubal ligation.  She denies any personal or family history of kidney stones.  She denies any chest pain, shortness of breath.    Allergies:  No Known Allergies    Problem List:    There are no active problems to display for this patient.       Past Medical History:    History reviewed. No pertinent past medical history.    Past Surgical History:    History reviewed. No pertinent surgical history.    Family History:    History reviewed. No pertinent family history.    Social History:  Marital Status:   [2]  Social History     Tobacco Use     Smoking status: Former Smoker     Packs/day: 0.00     Years: 40.00     Pack years: 0.00     Last attempt to quit: 10/2/2018     Years since quittin.5     Smokeless tobacco: Never Used   Substance Use Topics     Alcohol use: No     Frequency: Never     Drug use: No        Medications:      ALPRAZolam (XANAX) 0.25 MG tablet   ciprofloxacin (CIPRO) 500 MG tablet   clobetasol (TEMOVATE) 0.05 % ointment   Ibuprofen (ADVIL PO)   Levothyroxine Sodium (SYNTHROID PO)   metroNIDAZOLE (METROGEL) 0.75 % vaginal gel   omeprazole (PRILOSEC) 20 MG   "capsule   sulfamethoxazole-trimethoprim (BACTRIM DS) 800-160 MG tablet   VITAMIN D, CHOLECALCIFEROL, PO   mometasone (ELOCON) 0.1 % ointment         Review of Systems  Constitutional: Positive for chills. Negative for fever.   HENT: Negative for congestion.    Eyes: Negative for redness.   Respiratory: Negative for shortness of breath.    Cardiovascular: Negative for chest pain.   Gastrointestinal: Positive for abdominal pain, blood in stool, nausea and rectal pain. Negative for vomiting.   Genitourinary: Negative for difficulty urinating, dysuria and hematuria.   Musculoskeletal: Negative for arthralgias and neck stiffness.   Skin: Negative for color change.   Neurological: Negative for headaches.   Psychiatric/Behavioral: Negative for confusion.    Physical Exam   BP: 163/89  Heart Rate: 70  Temp: 97.3  F (36.3  C)  Resp: 16  Height: 157.5 cm (5' 2\")  Weight: 47.6 kg (105 lb)  SpO2: 100 %      Physical Exam  Constitutional: She is oriented to person, place, and time. No distress.   HENT:   Head: Atraumatic.   Mouth/Throat: Oropharynx is clear and moist. No oropharyngeal exudate.   Eyes: Pupils are equal, round, and reactive to light. No scleral icterus.   Cardiovascular: Normal heart sounds and intact distal pulses.   Pulmonary/Chest: Breath sounds normal. No respiratory distress.   Abdominal: Soft. Bowel sounds are normal. There is tenderness. There is no rebound and no guarding.   Palpable aorta, increased tenderness in RLQ but generally tender throughout.     Genitourinary:   Genitourinary Comments: deferred   Musculoskeletal: She exhibits no edema or tenderness.   Neurological: She is alert and oriented to person, place, and time.   Skin: Skin is warm and dry. No rash noted. She is not diaphoretic.   Nursing note and vitals reviewed.    ED Course          Results for orders placed or performed during the hospital encounter of 04/02/19 (from the past 24 hour(s))   CBC with platelets differential   Result Value " Ref Range    WBC 6.7 4.0 - 11.0 10e9/L    RBC Count 4.47 3.8 - 5.2 10e12/L    Hemoglobin 13.0 11.7 - 15.7 g/dL    Hematocrit 36.1 35.0 - 47.0 %    MCV 81 78 - 100 fl    MCH 29.1 26.5 - 33.0 pg    MCHC 36.0 31.5 - 36.5 g/dL    RDW 12.0 10.0 - 15.0 %    Platelet Count 233 150 - 450 10e9/L    Diff Method Automated Method     % Neutrophils 71.1 %    % Lymphocytes 19.2 %    % Monocytes 8.3 %    % Eosinophils 0.6 %    % Basophils 0.6 %    % Immature Granulocytes 0.2 %    Nucleated RBCs 0 0 /100    Absolute Neutrophil 4.7 1.6 - 8.3 10e9/L    Absolute Lymphocytes 1.3 0.8 - 5.3 10e9/L    Absolute Monocytes 0.6 0.0 - 1.3 10e9/L    Absolute Eosinophils 0.0 0.0 - 0.7 10e9/L    Absolute Basophils 0.0 0.0 - 0.2 10e9/L    Abs Immature Granulocytes 0.0 0 - 0.4 10e9/L    Absolute Nucleated RBC 0.0    Comprehensive metabolic panel   Result Value Ref Range    Sodium 128 (L) 133 - 144 mmol/L    Potassium 3.8 3.4 - 5.3 mmol/L    Chloride 93 (L) 94 - 109 mmol/L    Carbon Dioxide 26 20 - 32 mmol/L    Anion Gap 9 3 - 14 mmol/L    Glucose 101 (H) 70 - 99 mg/dL    Urea Nitrogen 6 (L) 7 - 30 mg/dL    Creatinine 0.87 0.52 - 1.04 mg/dL    GFR Estimate 73 >60 mL/min/[1.73_m2]    GFR Estimate If Black 84 >60 mL/min/[1.73_m2]    Calcium 9.9 8.5 - 10.1 mg/dL    Bilirubin Total 0.3 0.2 - 1.3 mg/dL    Albumin 3.9 3.4 - 5.0 g/dL    Protein Total 7.2 6.8 - 8.8 g/dL    Alkaline Phosphatase 96 40 - 150 U/L    ALT 24 0 - 50 U/L    AST 17 0 - 45 U/L   Lipase   Result Value Ref Range    Lipase 122 73 - 393 U/L   CT Abdomen Pelvis w Contrast    Narrative    EXAMINATION: CT ABDOMEN PELVIS W CONTRAST, 4/2/2019 7:15 PM    TECHNIQUE:  Helical CT images from the lung bases through the  symphysis pubis were obtained  with IV contrast. Contrast dose: Isovue  300 100ml Given    COMPARISON: July 31, 2018    HISTORY: Abd pain, unspecified; abdominal pain post rectocele repair  worse on right, prior appendectomy, right oophorectomy,  right  salpingectomy.    FINDINGS:    The lung bases are clear    The liver is free of masses or biliary ductal enlargement. No  calcified gallstones are seen.    The the spleen and pancreas appear normal. Low-density mass seen  previously in the pancreas is no longer visualized    The adrenal glands are normal.    The right and left kidneys are free of masses or hydronephrosis.    The periaortic lymph nodes are normal in caliber.    No intraperitoneal masses or inflammatory changes are noted.    In the pelvis the bladder appears normal. There is some bubbles of gas  and a low-density area extending from the 12:00 position of the rectum  into the adjacent soft tissues possibly a rectovaginal fistula. The  uterus appears normal. There is fluid seen within the vagina.     There is spondylolisthesis of L5 on S1.      Impression    IMPRESSION: Low-density tract at the 12:00 position of the rectum  possibly a rectovaginal fistula     KRISH SHARP MD       Medications   iopamidol (ISOVUE-300) IV solution 61% 100 mL (100 mLs Intravenous Given 4/2/19 1903)   sodium chloride (PF) 0.9% PF flush 60 mL (60 mLs Intravenous Given 4/2/19 1904)   0.9% sodium chloride BOLUS (0 mLs Intravenous Stopped 4/2/19 1949)   0.9% sodium chloride BOLUS (0 mLs Intravenous Stopped 4/2/19 2020)       Assessments & Plan (with Medical Decision Making)     I have reviewed the nursing notes.    I have reviewed the findings, diagnosis, plan and need for follow up with the patient.  Symptoms concerning given recent abdominal surgery.  CT obtained indicated possible fistula.  Patient states two of her brothers have had surgery for correction of rectal fistulas.  Contacted [facility] surgeon on call, Dr. Miller, who recommended switching to oral Cipro and Flagyl and he would arrange for patient to have earlier follow-up with her surgeon.  Patient states that she does not tolerate oral Flagyl and will instead continue with the topical Flagyl.   Recommend patient return to emergency department if severely worsening abdominal pain, new onset fever, other concerning symptoms.         Medication List      Started    ciprofloxacin 500 MG tablet  Commonly known as:  CIPRO  500 mg, Oral, 2 TIMES DAILY            Final diagnoses:   Abdominal pain, generalized   Rectal fistula     MISTY Telles on 4/3/2019 at 10:46 AM   4/2/2019   [facility]      Asael Prater PA  04/03/19 1046       Asael Prater PA  05/09/19 1027

## 2019-07-16 ENCOUNTER — HOSPITAL ENCOUNTER (EMERGENCY)
Facility: HOSPITAL | Age: 59
Discharge: HOME | End: 2019-07-16
Attending: NURSE PRACTITIONER | Admitting: NURSE PRACTITIONER
Payer: COMMERCIAL

## 2019-07-16 VITALS
DIASTOLIC BLOOD PRESSURE: 78 MMHG | RESPIRATION RATE: 14 BRPM | TEMPERATURE: 97.4 F | OXYGEN SATURATION: 100 % | SYSTOLIC BLOOD PRESSURE: 128 MMHG

## 2019-07-16 DIAGNOSIS — R51.9 ACUTE NONINTRACTABLE HEADACHE, UNSPECIFIED HEADACHE TYPE: ICD-10-CM

## 2019-07-16 PROCEDURE — 93010 ELECTROCARDIOGRAM REPORT: CPT | Performed by: INTERNAL MEDICINE

## 2019-07-16 PROCEDURE — 96372 THER/PROPH/DIAG INJ SC/IM: CPT

## 2019-07-16 PROCEDURE — 93005 ELECTROCARDIOGRAM TRACING: CPT

## 2019-07-16 PROCEDURE — 25000128 H RX IP 250 OP 636: Performed by: NURSE PRACTITIONER

## 2019-07-16 PROCEDURE — 99203 OFFICE O/P NEW LOW 30 MIN: CPT | Performed by: NURSE PRACTITIONER

## 2019-07-16 PROCEDURE — 25000131 ZZH RX MED GY IP 250 OP 636 PS 637: Performed by: NURSE PRACTITIONER

## 2019-07-16 PROCEDURE — G0463 HOSPITAL OUTPT CLINIC VISIT: HCPCS | Mod: 25

## 2019-07-16 RX ORDER — KETOROLAC TROMETHAMINE 30 MG/ML
30 INJECTION, SOLUTION INTRAMUSCULAR; INTRAVENOUS ONCE
Status: COMPLETED | OUTPATIENT
Start: 2019-07-16 | End: 2019-07-16

## 2019-07-16 RX ORDER — ONDANSETRON 4 MG/1
4 TABLET, ORALLY DISINTEGRATING ORAL ONCE
Status: COMPLETED | OUTPATIENT
Start: 2019-07-16 | End: 2019-07-16

## 2019-07-16 RX ADMIN — ONDANSETRON 4 MG: 4 TABLET, ORALLY DISINTEGRATING ORAL at 19:52

## 2019-07-16 RX ADMIN — KETOROLAC TROMETHAMINE 30 MG: 30 INJECTION, SOLUTION INTRAMUSCULAR at 19:52

## 2019-07-16 ASSESSMENT — ENCOUNTER SYMPTOMS
NAUSEA: 1
FACIAL SWELLING: 0
SORE THROAT: 0
DIARRHEA: 0
PHOTOPHOBIA: 0
LIGHT-HEADEDNESS: 0
COUGH: 0
FATIGUE: 0
TROUBLE SWALLOWING: 0
CHILLS: 0
DIZZINESS: 0
ACTIVITY CHANGE: 0
RHINORRHEA: 0
SINUS PRESSURE: 1
VOMITING: 0
MUSCULOSKELETAL NEGATIVE: 1
SINUS PAIN: 1
HEADACHES: 1
ABDOMINAL PAIN: 0
SHORTNESS OF BREATH: 0
EYES NEGATIVE: 1

## 2019-07-16 NOTE — ED AVS SNAPSHOT
[facility]  [address]  [place] [address]  Phone:  [phone]                                    Renu Curiel   MRN: [id number]    Department:  [facility]   Date of Visit:  7/16/2019           After Visit Summary Signature Page    I have received my discharge instructions, and my questions have been answered. I have discussed any challenges I see with this plan with the nurse or doctor.    ..........................................................................................................................................  Patient/Patient Representative Signature      ..........................................................................................................................................  Patient Representative Print Name and Relationship to Patient    ..................................................               ................................................  Date                                   Time    ..........................................................................................................................................  Reviewed by Signature/Title    ...................................................              ..............................................  Date                                               Time          22EPIC Rev 08/18

## 2019-07-17 NOTE — ED PROVIDER NOTES
"  History     Chief Complaint   Patient presents with     Headache     c/o headache, notes painful and tender behind ears. notes \"my ears feel weird too\". states seen at the clinic yesterday and not given any medications     HPI  Renu Curiel is a 59 year old female who presents with headaches, nausea, sinus pain and pressure, ear pain and congestion for the last two to three weeks. The pain radiates down into her jaws and up into her ears. She has recently started on estrogen and is concerned it may be contributing to her headaches. She took acetaminophen at 1300 today. Yesterday she tried pseudoephedrine and nasal spray without relief.  Denies fevers, chills, vomiting, diarrhea, and shortness of breath.      Allergies:  Allergies   Allergen Reactions     Seasonal Allergies      Tired stuffy eye water       Problem List:    There are no active problems to display for this patient.       Past Medical History:    No past medical history on file.    Past Surgical History:    No past surgical history on file.    Family History:    No family history on file.    Social History:  Marital Status:   [2]  Social History     Tobacco Use     Smoking status: Former Smoker     Packs/day: 0.00     Years: 40.00     Pack years: 0.00     Last attempt to quit: 10/2/2018     Years since quittin.7     Smokeless tobacco: Never Used   Substance Use Topics     Alcohol use: No     Frequency: Never     Drug use: No        Medications:      ALPRAZolam (XANAX) 0.25 MG tablet   clobetasol (TEMOVATE) 0.05 % ointment   Ibuprofen (ADVIL PO)   Levothyroxine Sodium (SYNTHROID PO)   omeprazole (PRILOSEC) 20 MG DR capsule   VITAMIN D, CHOLECALCIFEROL, PO   mometasone (ELOCON) 0.1 % ointment         Review of Systems   Constitutional: Negative for activity change, chills and fatigue.   HENT: Positive for congestion, ear pain, sinus pressure and sinus pain. Negative for facial swelling, rhinorrhea, sore throat and trouble swallowing.  "   Eyes: Negative.  Negative for photophobia and visual disturbance.   Respiratory: Negative for cough and shortness of breath.    Gastrointestinal: Positive for nausea (little bit). Negative for abdominal pain, diarrhea and vomiting.   Genitourinary: Negative.    Musculoskeletal: Negative.    Skin: Negative.    Neurological: Positive for headaches. Negative for dizziness and light-headedness.        Tingling in hamds at times       Physical Exam   BP: (!) 161/103  Heart Rate: 66  Temp: 97.4  F (36.3  C)  Resp: 14  SpO2: 100 %      Physical Exam   Constitutional: She is oriented to person, place, and time. She appears well-developed and well-nourished. She appears distressed.   HENT:   Head: Normocephalic.   Right Ear: Tympanic membrane normal.   Left Ear: A middle ear effusion is present.   Nose: Nose normal.   Mouth/Throat: Uvula is midline, oropharynx is clear and moist and mucous membranes are normal. No trismus in the jaw.   When she opens her jaw her mandibular bone comes so far forward is feels like it is displaced. When she shuts her mouth the jaw bone goes back into the normal position.   Eyes: Conjunctivae are normal.   Cardiovascular: Normal rate, regular rhythm and normal heart sounds.   No murmur heard.  Pulmonary/Chest: Effort normal and breath sounds normal. No respiratory distress. She has no wheezes.   Neurological: She is alert and oriented to person, place, and time.   Skin: Skin is warm and dry.   Nursing note and vitals reviewed.      ED Course        Procedures               No results found for this or any previous visit (from the past 24 hour(s)).    Medications   ondansetron (ZOFRAN-ODT) ODT tab 4 mg (4 mg Oral Given 7/16/19 1952)   ketorolac (TORADOL) injection 30 mg (30 mg Intramuscular Given 7/16/19 1952)       Assessments & Plan (with Medical Decision Making)     I have reviewed the nursing notes.    I have reviewed the findings, diagnosis, plan and need for follow up with the  patient.  Nonintractable headache possibly related to TMJ. Pain diminished with Toradol 30 mg IM and Zofran 4 mg ODT.She is going to follow-up with her dentist. Discharge instructions reviewed and understanding verbalized.         Medication List      There are no discharge medications for this visit.         Final diagnoses:   Acute nonintractable headache, unspecified headache type       7/16/2019   [facility]     Melodie Araya, CNP  07/18/19 1778

## 2019-08-09 ENCOUNTER — HOSPITAL ENCOUNTER (EMERGENCY)
Facility: HOSPITAL | Age: 59
Discharge: HOME | End: 2019-08-09
Attending: PHYSICIAN ASSISTANT | Admitting: PHYSICIAN ASSISTANT
Payer: COMMERCIAL

## 2019-08-09 ENCOUNTER — APPOINTMENT (OUTPATIENT)
Dept: CT IMAGING | Facility: HOSPITAL | Age: 59
End: 2019-08-09
Attending: PHYSICIAN ASSISTANT
Payer: COMMERCIAL

## 2019-08-09 VITALS
DIASTOLIC BLOOD PRESSURE: 86 MMHG | BODY MASS INDEX: 19.57 KG/M2 | SYSTOLIC BLOOD PRESSURE: 118 MMHG | RESPIRATION RATE: 16 BRPM | OXYGEN SATURATION: 95 % | TEMPERATURE: 96.6 F | WEIGHT: 107 LBS

## 2019-08-09 DIAGNOSIS — T50.905A MEDICATION REACTION, INITIAL ENCOUNTER: ICD-10-CM

## 2019-08-09 DIAGNOSIS — R11.10 VOMITING AND DIARRHEA: ICD-10-CM

## 2019-08-09 DIAGNOSIS — R51.9 ACUTE NONINTRACTABLE HEADACHE, UNSPECIFIED HEADACHE TYPE: ICD-10-CM

## 2019-08-09 DIAGNOSIS — R19.7 VOMITING AND DIARRHEA: ICD-10-CM

## 2019-08-09 LAB
ALBUMIN UR-MCNC: 30 MG/DL
ANION GAP SERPL CALCULATED.3IONS-SCNC: 7 MMOL/L (ref 3–14)
APPEARANCE UR: ABNORMAL
BACTERIA #/AREA URNS HPF: ABNORMAL /HPF
BASOPHILS # BLD AUTO: 0.1 10E9/L (ref 0–0.2)
BASOPHILS NFR BLD AUTO: 0.4 %
BILIRUB UR QL STRIP: NEGATIVE
BUN SERPL-MCNC: 14 MG/DL (ref 7–30)
CALCIUM SERPL-MCNC: 10.3 MG/DL (ref 8.5–10.1)
CHLORIDE SERPL-SCNC: 101 MMOL/L (ref 94–109)
CO2 SERPL-SCNC: 26 MMOL/L (ref 20–32)
COLOR UR AUTO: YELLOW
CREAT SERPL-MCNC: 0.96 MG/DL (ref 0.52–1.04)
DIFFERENTIAL METHOD BLD: ABNORMAL
EOSINOPHIL # BLD AUTO: 0.1 10E9/L (ref 0–0.7)
EOSINOPHIL NFR BLD AUTO: 0.6 %
ERYTHROCYTE [DISTWIDTH] IN BLOOD BY AUTOMATED COUNT: 12.1 % (ref 10–15)
GFR SERPL CREATININE-BSD FRML MDRD: 64 ML/MIN/{1.73_M2}
GLUCOSE SERPL-MCNC: 113 MG/DL (ref 70–99)
GLUCOSE UR STRIP-MCNC: NEGATIVE MG/DL
HCT VFR BLD AUTO: 49.9 % (ref 35–47)
HGB BLD-MCNC: 17.1 G/DL (ref 11.7–15.7)
HGB UR QL STRIP: NEGATIVE
HYALINE CASTS #/AREA URNS LPF: 140 /LPF
IMM GRANULOCYTES # BLD: 0.1 10E9/L (ref 0–0.4)
IMM GRANULOCYTES NFR BLD: 0.4 %
KETONES UR STRIP-MCNC: 10 MG/DL
LEUKOCYTE ESTERASE UR QL STRIP: NEGATIVE
LYMPHOCYTES # BLD AUTO: 0.2 10E9/L (ref 0.8–5.3)
LYMPHOCYTES NFR BLD AUTO: 1.5 %
MCH RBC QN AUTO: 28.7 PG (ref 26.5–33)
MCHC RBC AUTO-ENTMCNC: 34.3 G/DL (ref 31.5–36.5)
MCV RBC AUTO: 84 FL (ref 78–100)
MONOCYTES # BLD AUTO: 1 10E9/L (ref 0–1.3)
MONOCYTES NFR BLD AUTO: 7.2 %
MUCOUS THREADS #/AREA URNS LPF: PRESENT /LPF
NEUTROPHILS # BLD AUTO: 12.7 10E9/L (ref 1.6–8.3)
NEUTROPHILS NFR BLD AUTO: 89.9 %
NITRATE UR QL: NEGATIVE
NRBC # BLD AUTO: 0 10*3/UL
NRBC BLD AUTO-RTO: 0 /100
PH UR STRIP: 5.5 PH (ref 4.7–8)
PLATELET # BLD AUTO: 235 10E9/L (ref 150–450)
POTASSIUM SERPL-SCNC: 4 MMOL/L (ref 3.4–5.3)
RBC # BLD AUTO: 5.95 10E12/L (ref 3.8–5.2)
RBC #/AREA URNS AUTO: 2 /HPF (ref 0–2)
SODIUM SERPL-SCNC: 134 MMOL/L (ref 133–144)
SOURCE: ABNORMAL
SP GR UR STRIP: 1.02 (ref 1–1.03)
SQUAMOUS #/AREA URNS AUTO: 2 /HPF (ref 0–1)
UROBILINOGEN UR STRIP-MCNC: NORMAL MG/DL (ref 0–2)
WBC # BLD AUTO: 14.1 10E9/L (ref 4–11)
WBC #/AREA URNS AUTO: 4 /HPF (ref 0–5)

## 2019-08-09 PROCEDURE — 96361 HYDRATE IV INFUSION ADD-ON: CPT

## 2019-08-09 PROCEDURE — 81001 URINALYSIS AUTO W/SCOPE: CPT | Performed by: FAMILY MEDICINE

## 2019-08-09 PROCEDURE — 25000132 ZZH RX MED GY IP 250 OP 250 PS 637: Performed by: PHYSICIAN ASSISTANT

## 2019-08-09 PROCEDURE — 36415 COLL VENOUS BLD VENIPUNCTURE: CPT | Performed by: FAMILY MEDICINE

## 2019-08-09 PROCEDURE — 99214 OFFICE O/P EST MOD 30 MIN: CPT | Mod: Z6 | Performed by: PHYSICIAN ASSISTANT

## 2019-08-09 PROCEDURE — 80048 BASIC METABOLIC PNL TOTAL CA: CPT | Performed by: FAMILY MEDICINE

## 2019-08-09 PROCEDURE — G0463 HOSPITAL OUTPT CLINIC VISIT: HCPCS | Mod: 25

## 2019-08-09 PROCEDURE — 85025 COMPLETE CBC W/AUTO DIFF WBC: CPT | Performed by: FAMILY MEDICINE

## 2019-08-09 PROCEDURE — 96375 TX/PRO/DX INJ NEW DRUG ADDON: CPT

## 2019-08-09 PROCEDURE — 70450 CT HEAD/BRAIN W/O DYE: CPT | Mod: TC

## 2019-08-09 PROCEDURE — 25000128 H RX IP 250 OP 636: Performed by: PHYSICIAN ASSISTANT

## 2019-08-09 PROCEDURE — 96374 THER/PROPH/DIAG INJ IV PUSH: CPT

## 2019-08-09 RX ORDER — KETOROLAC TROMETHAMINE 30 MG/ML
30 INJECTION, SOLUTION INTRAMUSCULAR; INTRAVENOUS ONCE
Status: COMPLETED | OUTPATIENT
Start: 2019-08-09 | End: 2019-08-09

## 2019-08-09 RX ORDER — TRAMADOL HYDROCHLORIDE 50 MG/1
50 TABLET ORAL ONCE
Status: COMPLETED | OUTPATIENT
Start: 2019-08-09 | End: 2019-08-09

## 2019-08-09 RX ORDER — TRAMADOL HYDROCHLORIDE 50 MG/1
50 TABLET ORAL EVERY 6 HOURS PRN
Qty: 10 TABLET | Refills: 0 | Status: SHIPPED | OUTPATIENT
Start: 2019-08-09 | End: 2019-08-12

## 2019-08-09 RX ORDER — ONDANSETRON 2 MG/ML
4 INJECTION INTRAMUSCULAR; INTRAVENOUS EVERY 30 MIN PRN
Status: DISCONTINUED | OUTPATIENT
Start: 2019-08-09 | End: 2019-08-09 | Stop reason: HOSPADM

## 2019-08-09 RX ORDER — ONDANSETRON 8 MG/1
8 TABLET, ORALLY DISINTEGRATING ORAL EVERY 8 HOURS PRN
Qty: 10 TABLET | Refills: 1 | Status: SHIPPED | OUTPATIENT
Start: 2019-08-09

## 2019-08-09 RX ADMIN — TRAMADOL HYDROCHLORIDE 50 MG: 50 TABLET, COATED ORAL at 20:47

## 2019-08-09 RX ADMIN — KETOROLAC TROMETHAMINE 30 MG: 30 INJECTION, SOLUTION INTRAMUSCULAR at 19:02

## 2019-08-09 RX ADMIN — ONDANSETRON 4 MG: 2 INJECTION INTRAMUSCULAR; INTRAVENOUS at 19:02

## 2019-08-09 NOTE — ED TRIAGE NOTES
"Pt states was at clinic today and diagnosed with sinus infection. Started first dose of amoxicillin at 1000 \"and by noon I was vomiting and had the chills\", \"Then the diarrhea started\". C/O low back pain.  "

## 2019-08-09 NOTE — ED TRIAGE NOTES
Pt presents today with a diagnosed sinus infection and started taking the ABX today and ended up with nauseas vomiting and diarrhea from it.

## 2019-08-09 NOTE — ED AVS SNAPSHOT
[facility]  [address]  [place] [address]  Phone:  [phone]                                    Renu Curiel   MRN: [id number]    Department:  [facility]   Date of Visit:  8/9/2019           After Visit Summary Signature Page    I have received my discharge instructions, and my questions have been answered. I have discussed any challenges I see with this plan with the nurse or doctor.    ..........................................................................................................................................  Patient/Patient Representative Signature      ..........................................................................................................................................  Patient Representative Print Name and Relationship to Patient    ..................................................               ................................................  Date                                   Time    ..........................................................................................................................................  Reviewed by Signature/Title    ...................................................              ..............................................  Date                                               Time          22EPIC Rev 08/18

## 2019-08-10 ENCOUNTER — HOSPITAL ENCOUNTER (EMERGENCY)
Facility: HOSPITAL | Age: 59
Discharge: HOME | End: 2019-08-10
Attending: FAMILY MEDICINE | Admitting: FAMILY MEDICINE
Payer: COMMERCIAL

## 2019-08-10 VITALS
SYSTOLIC BLOOD PRESSURE: 135 MMHG | WEIGHT: 108 LBS | OXYGEN SATURATION: 98 % | RESPIRATION RATE: 18 BRPM | BODY MASS INDEX: 19.75 KG/M2 | TEMPERATURE: 98 F | DIASTOLIC BLOOD PRESSURE: 87 MMHG

## 2019-08-10 DIAGNOSIS — A04.72 COLITIS DUE TO CLOSTRIDIUM DIFFICILE: ICD-10-CM

## 2019-08-10 LAB
ALBUMIN SERPL-MCNC: 3.9 G/DL (ref 3.4–5)
ALP SERPL-CCNC: 102 U/L (ref 40–150)
ALT SERPL W P-5'-P-CCNC: 33 U/L (ref 0–50)
ANION GAP SERPL CALCULATED.3IONS-SCNC: 6 MMOL/L (ref 3–14)
AST SERPL W P-5'-P-CCNC: 26 U/L (ref 0–45)
BASOPHILS # BLD AUTO: 0 10E9/L (ref 0–0.2)
BASOPHILS NFR BLD AUTO: 0.4 %
BILIRUB SERPL-MCNC: 0.5 MG/DL (ref 0.2–1.3)
BUN SERPL-MCNC: 10 MG/DL (ref 7–30)
C DIFF TOX B STL QL: POSITIVE
CALCIUM SERPL-MCNC: 9 MG/DL (ref 8.5–10.1)
CHLORIDE SERPL-SCNC: 97 MMOL/L (ref 94–109)
CO2 SERPL-SCNC: 26 MMOL/L (ref 20–32)
CREAT SERPL-MCNC: 0.83 MG/DL (ref 0.52–1.04)
CRP SERPL-MCNC: 31.1 MG/L (ref 0–8)
DIFFERENTIAL METHOD BLD: ABNORMAL
EOSINOPHIL # BLD AUTO: 0.1 10E9/L (ref 0–0.7)
EOSINOPHIL NFR BLD AUTO: 1 %
ERYTHROCYTE [DISTWIDTH] IN BLOOD BY AUTOMATED COUNT: 11.9 % (ref 10–15)
G LAMBLIA+CRYPTOSP AG STL QL IA: NORMAL
G LAMBLIA+CRYPTOSP AG STL QL IA: NORMAL
GFR SERPL CREATININE-BSD FRML MDRD: 77 ML/MIN/{1.73_M2}
GLUCOSE SERPL-MCNC: 86 MG/DL (ref 70–99)
HCT VFR BLD AUTO: 41.4 % (ref 35–47)
HEMOCCULT STL QL IA: POSITIVE
HGB BLD-MCNC: 14.4 G/DL (ref 11.7–15.7)
IMM GRANULOCYTES # BLD: 0 10E9/L (ref 0–0.4)
IMM GRANULOCYTES NFR BLD: 0.4 %
LACTOFERRIN STL QL IA: NEGATIVE
LIPASE SERPL-CCNC: 89 U/L (ref 73–393)
LYMPHOCYTES # BLD AUTO: 0.6 10E9/L (ref 0.8–5.3)
LYMPHOCYTES NFR BLD AUTO: 7.3 %
MAGNESIUM SERPL-MCNC: 1.8 MG/DL (ref 1.6–2.3)
MCH RBC QN AUTO: 29.1 PG (ref 26.5–33)
MCHC RBC AUTO-ENTMCNC: 34.8 G/DL (ref 31.5–36.5)
MCV RBC AUTO: 84 FL (ref 78–100)
MONOCYTES # BLD AUTO: 0.6 10E9/L (ref 0–1.3)
MONOCYTES NFR BLD AUTO: 7.3 %
NEUTROPHILS # BLD AUTO: 6.4 10E9/L (ref 1.6–8.3)
NEUTROPHILS NFR BLD AUTO: 83.6 %
NRBC # BLD AUTO: 0 10*3/UL
NRBC BLD AUTO-RTO: 0 /100
PLATELET # BLD AUTO: 190 10E9/L (ref 150–450)
POTASSIUM SERPL-SCNC: 3.7 MMOL/L (ref 3.4–5.3)
PROT SERPL-MCNC: 7.1 G/DL (ref 6.8–8.8)
RBC # BLD AUTO: 4.95 10E12/L (ref 3.8–5.2)
SODIUM SERPL-SCNC: 129 MMOL/L (ref 133–144)
SPECIMEN SOURCE: ABNORMAL
SPECIMEN SOURCE: NORMAL
WBC # BLD AUTO: 7.7 10E9/L (ref 4–11)

## 2019-08-10 PROCEDURE — 25800030 ZZH RX IP 258 OP 636: Performed by: FAMILY MEDICINE

## 2019-08-10 PROCEDURE — 85025 COMPLETE CBC W/AUTO DIFF WBC: CPT | Performed by: FAMILY MEDICINE

## 2019-08-10 PROCEDURE — 83690 ASSAY OF LIPASE: CPT | Performed by: FAMILY MEDICINE

## 2019-08-10 PROCEDURE — 83630 LACTOFERRIN FECAL (QUAL): CPT | Performed by: FAMILY MEDICINE

## 2019-08-10 PROCEDURE — 87046 STOOL CULTR AEROBIC BACT EA: CPT | Performed by: FAMILY MEDICINE

## 2019-08-10 PROCEDURE — 87015 SPECIMEN INFECT AGNT CONCNTJ: CPT | Performed by: FAMILY MEDICINE

## 2019-08-10 PROCEDURE — 96361 HYDRATE IV INFUSION ADD-ON: CPT

## 2019-08-10 PROCEDURE — 83735 ASSAY OF MAGNESIUM: CPT | Performed by: FAMILY MEDICINE

## 2019-08-10 PROCEDURE — 99284 EMERGENCY DEPT VISIT MOD MDM: CPT | Mod: 25

## 2019-08-10 PROCEDURE — 86140 C-REACTIVE PROTEIN: CPT | Performed by: FAMILY MEDICINE

## 2019-08-10 PROCEDURE — 82274 ASSAY TEST FOR BLOOD FECAL: CPT | Performed by: FAMILY MEDICINE

## 2019-08-10 PROCEDURE — 99284 EMERGENCY DEPT VISIT MOD MDM: CPT | Mod: Z6 | Performed by: FAMILY MEDICINE

## 2019-08-10 PROCEDURE — 87493 C DIFF AMPLIFIED PROBE: CPT | Performed by: FAMILY MEDICINE

## 2019-08-10 PROCEDURE — 87329 GIARDIA AG IA: CPT | Performed by: FAMILY MEDICINE

## 2019-08-10 PROCEDURE — 96360 HYDRATION IV INFUSION INIT: CPT

## 2019-08-10 PROCEDURE — 36415 COLL VENOUS BLD VENIPUNCTURE: CPT | Performed by: FAMILY MEDICINE

## 2019-08-10 PROCEDURE — 80053 COMPREHEN METABOLIC PANEL: CPT | Performed by: FAMILY MEDICINE

## 2019-08-10 PROCEDURE — 87328 CRYPTOSPORIDIUM AG IA: CPT | Performed by: FAMILY MEDICINE

## 2019-08-10 PROCEDURE — 87045 FECES CULTURE AEROBIC BACT: CPT | Performed by: FAMILY MEDICINE

## 2019-08-10 PROCEDURE — 87899 AGENT NOS ASSAY W/OPTIC: CPT | Mod: 59 | Performed by: FAMILY MEDICINE

## 2019-08-10 RX ORDER — SODIUM CHLORIDE, SODIUM LACTATE, POTASSIUM CHLORIDE, CALCIUM CHLORIDE 600; 310; 30; 20 MG/100ML; MG/100ML; MG/100ML; MG/100ML
1000 INJECTION, SOLUTION INTRAVENOUS CONTINUOUS
Status: DISCONTINUED | OUTPATIENT
Start: 2019-08-10 | End: 2019-08-10 | Stop reason: HOSPADM

## 2019-08-10 RX ORDER — VANCOMYCIN HYDROCHLORIDE 125 MG/1
125 CAPSULE ORAL 4 TIMES DAILY
Qty: 40 CAPSULE | Refills: 0 | Status: SHIPPED | OUTPATIENT
Start: 2019-08-10 | End: 2019-08-20

## 2019-08-10 RX ADMIN — SODIUM CHLORIDE, POTASSIUM CHLORIDE, SODIUM LACTATE AND CALCIUM CHLORIDE 1000 ML: 600; 310; 30; 20 INJECTION, SOLUTION INTRAVENOUS at 09:25

## 2019-08-10 RX ADMIN — SODIUM CHLORIDE, POTASSIUM CHLORIDE, SODIUM LACTATE AND CALCIUM CHLORIDE 1000 ML: 600; 310; 30; 20 INJECTION, SOLUTION INTRAVENOUS at 10:03

## 2019-08-10 ASSESSMENT — ENCOUNTER SYMPTOMS
NERVOUS/ANXIOUS: 1
FATIGUE: 1
APPETITE CHANGE: 1
MUSCULOSKELETAL NEGATIVE: 1
CONSTIPATION: 0
DIARRHEA: 1
SHORTNESS OF BREATH: 0
LIGHT-HEADEDNESS: 0
WEAKNESS: 0
ABDOMINAL DISTENTION: 0
ACTIVITY CHANGE: 1
PALPITATIONS: 0
DYSURIA: 0
NAUSEA: 1
FEVER: 0
VOMITING: 0
ABDOMINAL PAIN: 1

## 2019-08-10 NOTE — ED PROVIDER NOTES
"  History     Chief Complaint   Patient presents with     Headache     Nausea, Vomiting, & Diarrhea     Abdominal Pain     HPI  Renu Curiel is a 59 year old female who presents to the emergency room with multiple complaints.  She is been having nausea, diarrhea, abdominal cramping and headache.  She was seen yesterday in the ED for the same complaints, however yesterday the cramping was not present.  Patient believes that the amoxicillin she took for possible sinus infection is what set this whole thing off, however she only took 1 dose.  Symptoms did start after the medication was taken.  She has had 10 episodes of diarrhea in the last 24 hours, states they are large volume and watery.  The abdominal cramping started at night, she also had leg cramps during the night.  She was taken Zofran for nausea, Excedrin for pain.  The Excedrin did help somewhat with her headache.  The diarrhea continues.    Allergies:  Allergies   Allergen Reactions     Ciprofloxacin      Numb, muscle weakness, tingling, jaw numbness     Amoxicillin Nausea and Vomiting     \"violently threw up\"- did eat first      Metronidazole      Can't take oral pill but can do gel     Seasonal Allergies      Tired stuffy eye water     Sulfamethoxazole W/Trimethoprim      Numbness, muscle weakness       Problem List:    There are no active problems to display for this patient.       Past Medical History:    No past medical history on file.    Past Surgical History:    No past surgical history on file.    Family History:    No family history on file.    Social History:  Marital Status:   [2]  Social History     Tobacco Use     Smoking status: Former Smoker     Packs/day: 0.00     Years: 40.00     Pack years: 0.00     Last attempt to quit: 10/2/2018     Years since quittin.8     Smokeless tobacco: Never Used   Substance Use Topics     Alcohol use: No     Frequency: Never     Drug use: No        Medications:      ALPRAZolam (XANAX) 0.25 MG " tablet   aspirin-acetaminophen-caffeine (EXCEDRIN MIGRAINE) 250-250-65 MG tablet   Ibuprofen (ADVIL PO)   Levothyroxine Sodium (SYNTHROID PO)   omeprazole (PRILOSEC) 20 MG DR capsule   ondansetron (ZOFRAN-ODT) 8 MG ODT tab   traMADol (ULTRAM) 50 MG tablet   vancomycin (VANCOCIN) 125 MG capsule   VITAMIN D, CHOLECALCIFEROL, PO   clobetasol (TEMOVATE) 0.05 % ointment   mometasone (ELOCON) 0.1 % ointment         Review of Systems   Constitutional: Positive for activity change, appetite change and fatigue. Negative for fever.   HENT: Negative.    Respiratory: Negative for shortness of breath.    Cardiovascular: Negative for chest pain and palpitations.   Gastrointestinal: Positive for abdominal pain, diarrhea and nausea. Negative for abdominal distention, constipation and vomiting.   Genitourinary: Negative for dysuria.   Musculoskeletal: Negative.    Skin: Negative.    Neurological: Negative for weakness and light-headedness.   Psychiatric/Behavioral: The patient is nervous/anxious.        Physical Exam   BP: 155/89  Heart Rate: 74  Temp: 97.2  F (36.2  C)  Resp: 18  Weight: 49 kg (108 lb)  SpO2: 98 %      Physical Exam   Constitutional: She is oriented to person, place, and time. She appears well-developed and well-nourished. She appears distressed.   HENT:   Head: Normocephalic and atraumatic.   Mouth/Throat: Oropharynx is clear and moist.   Cardiovascular: Normal rate, regular rhythm and normal heart sounds.   Pulmonary/Chest: Effort normal and breath sounds normal. No respiratory distress.   Abdominal: Soft. Normal appearance. She exhibits no distension. Bowel sounds are increased. There is generalized tenderness.   Neurological: She is alert and oriented to person, place, and time.   Skin: Skin is warm and dry. Capillary refill takes less than 2 seconds.   Psychiatric: She has a normal mood and affect. Her behavior is normal.   Nursing note and vitals reviewed.      ED Course        Procedures    Results for  orders placed or performed during the hospital encounter of 08/10/19 (from the past 24 hour(s))   CBC with platelets differential   Result Value Ref Range    WBC 7.7 4.0 - 11.0 10e9/L    RBC Count 4.95 3.8 - 5.2 10e12/L    Hemoglobin 14.4 11.7 - 15.7 g/dL    Hematocrit 41.4 35.0 - 47.0 %    MCV 84 78 - 100 fl    MCH 29.1 26.5 - 33.0 pg    MCHC 34.8 31.5 - 36.5 g/dL    RDW 11.9 10.0 - 15.0 %    Platelet Count 190 150 - 450 10e9/L    Diff Method Automated Method     % Neutrophils 83.6 %    % Lymphocytes 7.3 %    % Monocytes 7.3 %    % Eosinophils 1.0 %    % Basophils 0.4 %    % Immature Granulocytes 0.4 %    Nucleated RBCs 0 0 /100    Absolute Neutrophil 6.4 1.6 - 8.3 10e9/L    Absolute Lymphocytes 0.6 (L) 0.8 - 5.3 10e9/L    Absolute Monocytes 0.6 0.0 - 1.3 10e9/L    Absolute Eosinophils 0.1 0.0 - 0.7 10e9/L    Absolute Basophils 0.0 0.0 - 0.2 10e9/L    Abs Immature Granulocytes 0.0 0 - 0.4 10e9/L    Absolute Nucleated RBC 0.0    Comprehensive metabolic panel   Result Value Ref Range    Sodium 129 (L) 133 - 144 mmol/L    Potassium 3.7 3.4 - 5.3 mmol/L    Chloride 97 94 - 109 mmol/L    Carbon Dioxide 26 20 - 32 mmol/L    Anion Gap 6 3 - 14 mmol/L    Glucose 86 70 - 99 mg/dL    Urea Nitrogen 10 7 - 30 mg/dL    Creatinine 0.83 0.52 - 1.04 mg/dL    GFR Estimate 77 >60 mL/min/[1.73_m2]    GFR Estimate If Black 90 >60 mL/min/[1.73_m2]    Calcium 9.0 8.5 - 10.1 mg/dL    Bilirubin Total 0.5 0.2 - 1.3 mg/dL    Albumin 3.9 3.4 - 5.0 g/dL    Protein Total 7.1 6.8 - 8.8 g/dL    Alkaline Phosphatase 102 40 - 150 U/L    ALT 33 0 - 50 U/L    AST 26 0 - 45 U/L   Lipase   Result Value Ref Range    Lipase 89 73 - 393 U/L   CRP inflammation   Result Value Ref Range    CRP Inflammation 31.1 (H) 0.0 - 8.0 mg/L   Magnesium   Result Value Ref Range    Magnesium 1.8 1.6 - 2.3 mg/dL   Occult blood fecal HGB immuno   Result Value Ref Range    Occult Blood HGB FIT Positive (A) NEG^Negative   Fecal Lactoferrin   Result Value Ref Range    Fecal  Lactoferrin Negative NEG^Negative   Clostridium difficile toxin B PCR   Result Value Ref Range    Specimen Description Feces     C Diff Toxin B PCR Positive (AA) NEG^Negative       Medications   lactated ringers BOLUS 1,000 mL (0 mLs Intravenous Stopped 8/10/19 1004)     Followed by   lactated ringers infusion (1,000 mLs Intravenous New Bag 8/10/19 1003)       Assessments & Plan (with Medical Decision Making)   Diarrhea is not from her one amoxicillin tablet, she has C. difficile.  We will plan to treat her with vancomycin for 10 days, she should follow-up with Dr. Montoya in 1 week for reevaluation.  Labs are normal with the exception of the C. difficile and positive occult blood.    I have reviewed the nursing notes.    I have reviewed the findings, diagnosis, plan and need for follow up with the patient.  New Prescriptions    VANCOMYCIN (VANCOCIN) 125 MG CAPSULE    Take 1 capsule (125 mg) by mouth 4 times daily for 10 days       Final diagnoses:   Colitis due to Clostridium difficile       8/10/2019   [facility]     Roxy Okeefe MD  08/10/19 [id number]

## 2019-08-10 NOTE — ED NOTES
60 y/o female presents with reports of nausea, diarrhea, abdominal cramping and headache. Patient was evaluated in ED yesterday for n/v/d and headache. Patient states yesterday morning she took and Amoxicillin for a possible sinus infection. Amoxicillin listed as an allergy. Patient states symptoms began after med administration. Patient states she has had 10 episodes of diarrhea in last 24 hours. Abdominal cramping began throughout the night, also reports legs cramping. Took Zofran at 0400, Excedrin at 0200. No vomiting this morning.

## 2019-08-10 NOTE — ED AVS SNAPSHOT
[facility]  [address]  [place] [address]  Phone:  [phone]                                    Renu Curiel   MRN: [id number]    Department:  [facility]   Date of Visit:  8/10/2019           After Visit Summary Signature Page    I have received my discharge instructions, and my questions have been answered. I have discussed any challenges I see with this plan with the nurse or doctor.    ..........................................................................................................................................  Patient/Patient Representative Signature      ..........................................................................................................................................  Patient Representative Print Name and Relationship to Patient    ..................................................               ................................................  Date                                   Time    ..........................................................................................................................................  Reviewed by Signature/Title    ...................................................              ..............................................  Date                                               Time          22EPIC Rev 08/18

## 2019-08-10 NOTE — ED PROVIDER NOTES
History     Chief Complaint   Patient presents with     Nausea, Vomiting, & Diarrhea     Dizziness     HPI  Renu Curiel is a 59 year old female who presents to the ED with complaints of multiple episodes of vomiting and diarrhea preceded by back pain.  Symptoms started 2 hours after first dose of amoxicillin taken at 10AM today.  Patient continues to have diarrhea in ED but vomiting has stopped.  Patient was given amoxicillin to treat possible sinusitis as she has been having headaches intermittently since .  Was treated for headache in ED in July but states the medication only resolved headache for a couple hours.  Patient states she had an MRI about 4 years ago that was unremarkable.  She denies vision changes, balance problems.  Denies fever, urinary symptoms.      Allergies:  Allergies   Allergen Reactions     Seasonal Allergies      Tired stuffy eye water       Problem List:    There are no active problems to display for this patient.       Past Medical History:    No past medical history on file.    Past Surgical History:    No past surgical history on file.    Family History:    No family history on file.    Social History:  Marital Status:   [2]  Social History     Tobacco Use     Smoking status: Former Smoker     Packs/day: 0.00     Years: 40.00     Pack years: 0.00     Last attempt to quit: 10/2/2018     Years since quittin.8     Smokeless tobacco: Never Used   Substance Use Topics     Alcohol use: No     Frequency: Never     Drug use: No        Medications:      ALPRAZolam (XANAX) 0.25 MG tablet   Ibuprofen (ADVIL PO)   Levothyroxine Sodium (SYNTHROID PO)   omeprazole (PRILOSEC) 20 MG DR capsule   ondansetron (ZOFRAN-ODT) 8 MG ODT tab   VITAMIN D, CHOLECALCIFEROL, PO   clobetasol (TEMOVATE) 0.05 % ointment   mometasone (ELOCON) 0.1 % ointment         Review of Systems  Constitutional: Negative for fever.   HENT: Negative for rhinorrhea and sinus pressure.    Gastrointestinal:  Positive for diarrhea, nausea and vomiting. Negative for abdominal pain and blood in stool.   Genitourinary: Negative.    Musculoskeletal: Positive for back pain.   Neurological: Positive for headaches (across forehead and face). Negative for dizziness, facial asymmetry, speech difficulty, weakness and numbness.     Physical Exam   BP: 118/86  Heart Rate: 90  Temp: 96.6  F (35.9  C)  Resp: 16  Weight: 48.5 kg (107 lb)  SpO2: 95 %      Physical Exam  Constitutional: No distress.   HENT:   Head: Atraumatic.   Mouth/Throat: Oropharynx is clear and moist. No oropharyngeal exudate.   Eyes: Pupils are equal, round, and reactive to light. No scleral icterus.   Abdominal: Soft. Bowel sounds are increased. There is no tenderness.   Musculoskeletal: She exhibits no edema or tenderness.   Neurological:   Neuro: Alert and oriented x 3.  Cranial nerves II-XII intact.  Rapid alternating finger movements intact.  Finger to nose intact.  5/5 upper and lower extremity strength.  Heel/shin slide intact.  Patellar tendon reflexes intact.  Gait normal. Romberg negative.  Sensation intact and equal in bilateral upper and lower extremities.      Skin: Skin is warm. No rash noted. She is not diaphoretic.    ED Course        Procedures  Discussed likely adverse reaction to amoxicillin.  Recommended discontinue amox.  Patient given 1L NS IV, Zofran 4 mg IV and Toradol 30 mg IV.  No significant relief of headache.  Tramadol given.  CT head results pending at time of discharge.  Referral to neurology provided.  F/u with primary provider.                 Results for orders placed or performed during the hospital encounter of 08/09/19 (from the past 24 hour(s))   CBC with platelets differential   Result Value Ref Range    WBC 14.1 (H) 4.0 - 11.0 10e9/L    RBC Count 5.95 (H) 3.8 - 5.2 10e12/L    Hemoglobin 17.1 (H) 11.7 - 15.7 g/dL    Hematocrit 49.9 (H) 35.0 - 47.0 %    MCV 84 78 - 100 fl    MCH 28.7 26.5 - 33.0 pg    MCHC 34.3 31.5 - 36.5 g/dL     RDW 12.1 10.0 - 15.0 %    Platelet Count 235 150 - 450 10e9/L    Diff Method Automated Method     % Neutrophils 89.9 %    % Lymphocytes 1.5 %    % Monocytes 7.2 %    % Eosinophils 0.6 %    % Basophils 0.4 %    % Immature Granulocytes 0.4 %    Nucleated RBCs 0 0 /100    Absolute Neutrophil 12.7 (H) 1.6 - 8.3 10e9/L    Absolute Lymphocytes 0.2 (L) 0.8 - 5.3 10e9/L    Absolute Monocytes 1.0 0.0 - 1.3 10e9/L    Absolute Eosinophils 0.1 0.0 - 0.7 10e9/L    Absolute Basophils 0.1 0.0 - 0.2 10e9/L    Abs Immature Granulocytes 0.1 0 - 0.4 10e9/L    Absolute Nucleated RBC 0.0    Basic metabolic panel   Result Value Ref Range    Sodium 134 133 - 144 mmol/L    Potassium 4.0 3.4 - 5.3 mmol/L    Chloride 101 94 - 109 mmol/L    Carbon Dioxide 26 20 - 32 mmol/L    Anion Gap 7 3 - 14 mmol/L    Glucose 113 (H) 70 - 99 mg/dL    Urea Nitrogen 14 7 - 30 mg/dL    Creatinine 0.96 0.52 - 1.04 mg/dL    GFR Estimate 64 >60 mL/min/[1.73_m2]    GFR Estimate If Black 74 >60 mL/min/[1.73_m2]    Calcium 10.3 (H) 8.5 - 10.1 mg/dL   Routine UA with microscopic   Result Value Ref Range    Color Urine Yellow     Appearance Urine Slightly Cloudy     Glucose Urine Negative NEG^Negative mg/dL    Bilirubin Urine Negative NEG^Negative    Ketones Urine 10 (A) NEG^Negative mg/dL    Specific Gravity Urine 1.019 1.003 - 1.035    Blood Urine Negative NEG^Negative    pH Urine 5.5 4.7 - 8.0 pH    Protein Albumin Urine 30 (A) NEG^Negative mg/dL    Urobilinogen mg/dL Normal 0.0 - 2.0 mg/dL    Nitrite Urine Negative NEG^Negative    Leukocyte Esterase Urine Negative NEG^Negative    Source Midstream Urine     WBC Urine 4 0 - 5 /HPF    RBC Urine 2 0 - 2 /HPF    Bacteria Urine Few (A) NEG^Negative /HPF    Squamous Epithelial /HPF Urine 2 (H) 0 - 1 /HPF    Mucous Urine Present (A) NEG^Negative /LPF    Hyaline Casts 140 (A) OTO2^O - 2 /LPF       Medications   ondansetron (ZOFRAN) injection 4 mg (4 mg Intravenous Given 8/9/19 1902)   0.9% sodium chloride BOLUS (1,000  mLs Intravenous New Bag 8/9/19 1902)   ketorolac (TORADOL) injection 30 mg (30 mg Intravenous Given 8/9/19 1902)       Assessments & Plan (with Medical Decision Making)     I have reviewed the nursing notes.    I have reviewed the findings, diagnosis, plan and need for follow up with the patient.    New Prescriptions    ONDANSETRON (ZOFRAN-ODT) 8 MG ODT TAB    Take 1 tablet (8 mg) by mouth every 8 hours as needed for nausea       Final diagnoses:   Acute nonintractable headache, unspecified headache type   Vomiting and diarrhea   Medication reaction, initial encounter     MISTY Telles on 8/9/2019 at 8:48 PM   8/9/2019   [facility]     Asael Prater PA  08/09/19 [id number]

## 2019-08-10 NOTE — ED NOTES
DATE:  8/10/2019   TIME OF RECEIPT FROM LAB:  1059  LAB TEST:  C Diff  LAB VALUE:  Positive  RESULTS GIVEN WITH READ-BACK TO (PROVIDER):  Roxy Okeefe*  TIME LAB VALUE REPORTED TO PROVIDER:   4570

## 2019-08-10 NOTE — ED NOTES
Patient discharged home at this time. Patient given written and verbal discharge instructions regarding home care, f/u and medications. Patient verbalized understanding of all discharge instructions. Extensive discharge instructions regarding C Diff and hygiene provided. Aware RX at [facility]

## 2019-08-14 LAB
BACTERIA SPEC CULT: NORMAL
BACTERIA SPEC CULT: NORMAL
E COLI SXT1+2 STL IA: NORMAL
SPECIMEN SOURCE: NORMAL

## 2020-04-28 ENCOUNTER — TRANSCRIBE ORDERS (OUTPATIENT)
Dept: OTHER | Age: 60
End: 2020-04-28

## 2020-04-28 DIAGNOSIS — N95.2 ATROPHIC VAGINITIS: ICD-10-CM

## 2020-04-28 DIAGNOSIS — N76.0 VULVOVAGINITIS: ICD-10-CM

## 2020-04-28 DIAGNOSIS — B96.89 BACTERIAL VAGINOSIS: Primary | ICD-10-CM

## 2020-04-28 DIAGNOSIS — L90.0 LICHEN SCLEROSUS: ICD-10-CM

## 2020-04-28 DIAGNOSIS — N76.0 BACTERIAL VAGINOSIS: Primary | ICD-10-CM

## 2020-05-06 ASSESSMENT — ENCOUNTER SYMPTOMS
NAUSEA: 0
HYPOTENSION: 0
LOSS OF CONSCIOUSNESS: 0
DIZZINESS: 0
FLANK PAIN: 1
NECK PAIN: 1
HEARTBURN: 1
NERVOUS/ANXIOUS: 1
SORE THROAT: 0
NAIL CHANGES: 0
SKIN CHANGES: 0
SMELL DISTURBANCE: 0
HALLUCINATIONS: 0
SYNCOPE: 0
BLOATING: 1
STIFFNESS: 1
BACK PAIN: 1
CONSTIPATION: 0
NIGHT SWEATS: 1
MUSCLE WEAKNESS: 1
RECTAL PAIN: 0
CHILLS: 1
EXERCISE INTOLERANCE: 0
LIGHT-HEADEDNESS: 0
SPEECH CHANGE: 0
JOINT SWELLING: 1
MUSCLE CRAMPS: 0
PALPITATIONS: 0
TREMORS: 0
DYSURIA: 1
PARALYSIS: 0
BRUISES/BLEEDS EASILY: 0
JAUNDICE: 0
DECREASED CONCENTRATION: 0
NUMBNESS: 1
POOR WOUND HEALING: 0
DIFFICULTY URINATING: 0
FEVER: 0
SLEEP DISTURBANCES DUE TO BREATHING: 0
INCREASED ENERGY: 0
HEADACHES: 1
SINUS CONGESTION: 1
BOWEL INCONTINENCE: 0
DISTURBANCES IN COORDINATION: 0
FATIGUE: 1
ABDOMINAL PAIN: 1
HYPERTENSION: 0
VOMITING: 0
ALTERED TEMPERATURE REGULATION: 1
MYALGIAS: 1
TROUBLE SWALLOWING: 0
POLYDIPSIA: 0
SINUS PAIN: 0
DECREASED LIBIDO: 1
DECREASED APPETITE: 1
LEG PAIN: 0
DEPRESSION: 0
POLYPHAGIA: 0
NECK MASS: 0
SWOLLEN GLANDS: 0
HOT FLASHES: 1
HEMATURIA: 0
SEIZURES: 0
WEAKNESS: 1
WEIGHT LOSS: 1
BLOOD IN STOOL: 0
PANIC: 0
TASTE DISTURBANCE: 0
TINGLING: 1
HOARSE VOICE: 0
MEMORY LOSS: 0
ORTHOPNEA: 0
WEIGHT GAIN: 0
ARTHRALGIAS: 1
INSOMNIA: 0
DIARRHEA: 0

## 2020-05-06 ASSESSMENT — ANXIETY QUESTIONNAIRES
6. BECOMING EASILY ANNOYED OR IRRITABLE: NOT AT ALL
2. NOT BEING ABLE TO STOP OR CONTROL WORRYING: NOT AT ALL
7. FEELING AFRAID AS IF SOMETHING AWFUL MIGHT HAPPEN: NOT AT ALL
7. FEELING AFRAID AS IF SOMETHING AWFUL MIGHT HAPPEN: NOT AT ALL
4. TROUBLE RELAXING: NOT AT ALL
GAD7 TOTAL SCORE: 2
5. BEING SO RESTLESS THAT IT IS HARD TO SIT STILL: NOT AT ALL
3. WORRYING TOO MUCH ABOUT DIFFERENT THINGS: SEVERAL DAYS
GAD7 TOTAL SCORE: 2
1. FEELING NERVOUS, ANXIOUS, OR ON EDGE: SEVERAL DAYS

## 2020-05-07 ASSESSMENT — ANXIETY QUESTIONNAIRES: GAD7 TOTAL SCORE: 2

## 2020-05-13 ENCOUNTER — TELEPHONE (OUTPATIENT)
Dept: OBGYN | Facility: CLINIC | Age: 60
End: 2020-05-13

## 2020-05-13 NOTE — TELEPHONE ENCOUNTER
Records in care everywhere- I put this in appointment note and let patient know.      ----- Message from David Wasnick sent at 5/13/2020 11:11 AM CDT -----  Regarding: Pt is wondering if her records have been received  Contact: [phone]  Pt is wondering if her records from  Dr. Vilma Worrell at [facility] have been received yet.  Please follow up with the Pt.     Thank you,  David    Call Center

## 2020-05-19 ENCOUNTER — OFFICE VISIT (OUTPATIENT)
Dept: OBGYN | Facility: CLINIC | Age: 60
End: 2020-05-19
Attending: OBSTETRICS & GYNECOLOGY
Payer: COMMERCIAL

## 2020-05-19 ENCOUNTER — ANCILLARY PROCEDURE (OUTPATIENT)
Dept: ULTRASOUND IMAGING | Facility: CLINIC | Age: 60
End: 2020-05-19
Attending: OBSTETRICS & GYNECOLOGY
Payer: COMMERCIAL

## 2020-05-19 VITALS
HEART RATE: 74 BPM | SYSTOLIC BLOOD PRESSURE: 135 MMHG | BODY MASS INDEX: 18.66 KG/M2 | DIASTOLIC BLOOD PRESSURE: 85 MMHG | WEIGHT: 102 LBS

## 2020-05-19 DIAGNOSIS — B96.89 BACTERIAL VAGINOSIS: ICD-10-CM

## 2020-05-19 DIAGNOSIS — N76.0 VULVOVAGINITIS: ICD-10-CM

## 2020-05-19 DIAGNOSIS — N95.2 ATROPHIC VAGINITIS: ICD-10-CM

## 2020-05-19 DIAGNOSIS — L90.0 LICHEN SCLEROSUS: ICD-10-CM

## 2020-05-19 DIAGNOSIS — N76.0 BACTERIAL VAGINOSIS: ICD-10-CM

## 2020-05-19 DIAGNOSIS — R63.4 UNEXPLAINED WEIGHT LOSS: ICD-10-CM

## 2020-05-19 DIAGNOSIS — R63.4 UNEXPLAINED WEIGHT LOSS: Primary | ICD-10-CM

## 2020-05-19 LAB
ALBUMIN UR-MCNC: NEGATIVE MG/DL
APPEARANCE UR: CLEAR
BILIRUB UR QL STRIP: NEGATIVE
COLOR UR AUTO: YELLOW
GLUCOSE UR STRIP-MCNC: NEGATIVE MG/DL
HGB UR QL STRIP: NEGATIVE
KETONES UR STRIP-MCNC: NEGATIVE MG/DL
LEUKOCYTE ESTERASE UR QL STRIP: NEGATIVE
NITRATE UR QL: NEGATIVE
PH UR STRIP: 7 PH (ref 5–7)
SP GR UR STRIP: 1.01 (ref 1–1.03)
UROBILINOGEN UR STRIP-ACNC: 0.2 EU/DL (ref 0.2–1)

## 2020-05-19 PROCEDURE — G0463 HOSPITAL OUTPT CLINIC VISIT: HCPCS | Mod: ZF

## 2020-05-19 PROCEDURE — 81003 URINALYSIS AUTO W/O SCOPE: CPT

## 2020-05-19 PROCEDURE — 87102 FUNGUS ISOLATION CULTURE: CPT | Performed by: OBSTETRICS & GYNECOLOGY

## 2020-05-19 PROCEDURE — 76830 TRANSVAGINAL US NON-OB: CPT

## 2020-05-19 RX ORDER — HYDROXYZINE PAMOATE 25 MG/1
25 CAPSULE ORAL
COMMUNITY
Start: 2020-05-19

## 2020-05-19 RX ORDER — METRONIDAZOLE 7.5 MG/G
GEL VAGINAL
COMMUNITY
Start: 2020-04-14

## 2020-05-19 RX ORDER — METHOCARBAMOL 500 MG/1
TABLET, FILM COATED ORAL
COMMUNITY
Start: 2020-04-01 | End: 2020-05-19

## 2020-05-19 RX ORDER — EVE PRIMROSE/LINOLEIC/G-LENIC 1000 MG
CAPSULE ORAL
COMMUNITY
Start: 2020-04-07 | End: 2020-05-19

## 2020-05-19 RX ORDER — LEVOTHYROXINE SODIUM 100 MCG
TABLET ORAL
COMMUNITY
Start: 2019-05-31 | End: 2020-05-19

## 2020-05-19 RX ORDER — DULOXETINE 40 MG/1
CAPSULE, DELAYED RELEASE ORAL
COMMUNITY
Start: 2020-04-07 | End: 2020-05-19

## 2020-05-19 RX ORDER — ESTRADIOL 10 UG/1
INSERT VAGINAL
COMMUNITY
Start: 2020-04-20

## 2020-05-19 RX ORDER — AMITRIPTYLINE HYDROCHLORIDE 10 MG/1
TABLET ORAL
COMMUNITY
Start: 2020-01-27

## 2020-05-19 RX ORDER — HALOBETASOL PROPIONATE 0.05 %
OINTMENT (GRAM) TOPICAL
COMMUNITY
Start: 2019-12-31

## 2020-05-19 ASSESSMENT — PAIN SCALES - GENERAL: PAINLEVEL: NO PAIN (0)

## 2020-05-19 NOTE — LETTER
5/19/2020       RE: Renu Curiel  [address] [facility] [address]     Dear Colleague,    Thank you for referring your patient, Renu Curiel, to the [facility] at [facility]. Please see a copy of my visit note below.    CC:  Consult from   HPI:   Renu Mccann is a postmenopausal female.  No LMP recorded. Patient is postmenopausal. She had had ongoing issue with vulvar pain and burning. She has tried a variety of treatments, including vaginal estrogen, steroids, and treatment multiple times for infections (BV and  Yeast).     Vulvar bx from 2017-   Vulva, biopsy: Focal minimal non-specific chronic inflammation; features of lichen sclerosus not identified.  - No dysplasia or malignancy identified.  - GMS stain negative for fungal organisms.         She has also had issues with breast/shoulder pain/fullness.      She notes unintentional wt loss of 10 lbs, that has been being worked up by GI.     Patients records are available and reviewed at today's visit.    Past GYN history:  No STD history  Last PAP smear:  Normal    Past Medical History:   Diagnosis Date     Thyroid disease 2008    can't stabilize       Past Surgical History:   Procedure Laterality Date     APPENDECTOMY  10/2/18    heart stopped when they put gas in     BIOPSY  2014 or 2015    checked for lichen sclerosis     COLONOSCOPY  10/1/2019     GENITOURINARY SURGERY  2000    tubal ligation     GYN SURGERY  10/2/18    right ovary removed - heart stopped     ORTHOPEDIC SURGERY  2013, 2014, 2018    bunyons & shoulder       Family History   Problem Relation Age of Onset     Other Cancer Cousin         bladder cancer     Coronary Artery Disease Father         Pacemaker     Hypertension Father      Coronary Artery Disease Brother         Quintuple bypass       Allergies: Ciprofloxacin; Clindamycin; Prednisone; Amoxicillin; Cymbalta; Metronidazole; Seasonal allergies; and Sulfamethoxazole  w/trimethoprim    Current Outpatient Medications   Medication Sig Dispense Refill     ALPRAZolam (XANAX) 0.25 MG tablet Take 0.25 mg by mouth 3 times daily as needed        amitriptyline (ELAVIL) 10 MG tablet 1-2 po qhs       aspirin-acetaminophen-caffeine (EXCEDRIN MIGRAINE) 250-250-65 MG tablet Take 1 tablet by mouth every 6 hours as needed for headaches       estradiol (ESTRING) 2 MG vaginal ring Place 1 each vaginally every 3 months 1 each 3     estradiol (VAGIFEM) 10 MCG TABS vaginal tablet twice a week        halobetasol (ULTRAVATE) 0.05 % ointment        hydrOXYzine (VISTARIL) 25 MG capsule Take 25 mg by mouth       Ibuprofen (ADVIL PO) Take 600 mg by mouth every 8 hours as needed for moderate pain       Levothyroxine Sodium (SYNTHROID PO) Take 88 mcg by mouth daily 88 mcg x 6 days a weeks 176 mcg 1 day a week       metroNIDAZOLE (METROGEL) 0.75 % vaginal gel Insert 1 Applicator into the vagina every Monday, Wednesday and Saturday for 30 days.       nicotine (NICORETTE) 2 MG gum Take 2 mg by mouth       omeprazole (PRILOSEC) 20 MG DR capsule Take 20 mg by mouth daily        ondansetron (ZOFRAN-ODT) 8 MG ODT tab Take 1 tablet (8 mg) by mouth every 8 hours as needed for nausea 10 tablet 1     VITAMIN D, CHOLECALCIFEROL, PO Take 1,000 Units by mouth daily       ROS:  C: NEGATIVE for fever, chills, WT LOSS  I: NEGATIVE for worrisome rashes, moles or lesions  E: NEGATIVE for vision changes or irritation  E/M: NEGATIVE for ear, mouth and throat problems  R: NEGATIVE for significant cough or SOB  CV: NEGATIVE for chest pain, palpitations or peripheral edema  GI: NEGATIVE for nausea, abdominal pain, heartburn, or change in bowel habits  : NEGATIVE for frequency, dysuria, hematuria, vaginal discharge  M: NEGATIVE for significant arthralgias or myalgia  N: NEGATIVE for weakness, dizziness or paresthesias  E: NEGATIVE for temperature intolerance, skin/hair changes  P: NEGATIVE for changes in mood or  affect    EXAM:  Blood pressure 135/85, pulse 74, weight 46.3 kg (102 lb), not currently breastfeeding.   BMI= Body mass index is 18.66 kg/m .  General - pleasant female in no acute distress.  Neck - supple without lymphadenopathy or thyromegaly.  Breast - no nodularity, asymmetry or nipple discharge bilaterally. Pectoral muscles pronounced d/t thin frame  Abdomen - soft, nontender, nondistended, no hepatosplenomegaly.  Pelvic - EG: normal adult female, BUS: loss of architecture over clitoris and between labia minora/majora bilaterally. The urethral orifice is irritated appearing.  Vagina: atrophic with minimal discharge, Cervix: no lesions or CMT, Uterus: firm, normal sized and nontender, Adnexae: no masses or tenderness.  Rectovaginal - deferred.  Musculoskeletal - no gross deformities.  Neurological - normal strength, sensation, and mental status.    ASSESSMENT/PLAN:  Unexplained weight loss   US Pelvic Complete with Transvaginal          Vulvovaginitis  Yeast Culture          Lichen sclerosus  Clobetasol over clitoral huynh and posterior fourchette.    Bx from 3 yrs ago, neg for LS, but clinically very c/w LS. Consider re biopsy if not improved with this regimen    Atrophic vaginitis  estradiol (ESTRING) 2 MG vaginal ring- so daily dose of estrogen being delivered. Also decreases the amount of discharge which bothers the patient.          Recommend using barrier over the urethral orifice.  D/t the scarring of LS, the atrophic vaginal/urethral tissue is exposed and irritated.      Avoid steroid on vaginal mucosa.    Billie Gar MD, FACOG  [facility]  OB/GYN    5/21/2020  8:12 PM

## 2020-05-19 NOTE — NURSING NOTE
Chief Complaint   Patient presents with     Establish Care     C/O possible BV , Lichen sclerosis

## 2020-05-20 ENCOUNTER — MYC MEDICAL ADVICE (OUTPATIENT)
Dept: OBGYN | Facility: CLINIC | Age: 60
End: 2020-05-20

## 2020-05-20 NOTE — PROGRESS NOTES
CC:  Consult from   HPI:   Renu Mccann is a postmenopausal female.  No LMP recorded. Patient is postmenopausal. She had had ongoing issue with vulvar pain and burning. She has tried a variety of treatments, including vaginal estrogen, steroids, and treatment multiple times for infections (BV and  Yeast).     Vulvar bx from 2017-   Vulva, biopsy: Focal minimal non-specific chronic inflammation; features of lichen sclerosus not identified.  - No dysplasia or malignancy identified.  - GMS stain negative for fungal organisms.         She has also had issues with breast/shoulder pain/fullness.      She notes unintentional wt loss of 10 lbs, that has been being worked up by GI.     Patients records are available and reviewed at today's visit.    Past GYN history:  No STD history  Last PAP smear:  Normal    Past Medical History:   Diagnosis Date     Thyroid disease 2008    can't stabilize       Past Surgical History:   Procedure Laterality Date     APPENDECTOMY  10/2/18    heart stopped when they put gas in     BIOPSY  2014 or 2015    checked for lichen sclerosis     COLONOSCOPY  10/1/2019     GENITOURINARY SURGERY  2000    tubal ligation     GYN SURGERY  10/2/18    right ovary removed - heart stopped     ORTHOPEDIC SURGERY  2013, 2014, 2018    bunyons & shoulder       Family History   Problem Relation Age of Onset     Other Cancer Cousin         bladder cancer     Coronary Artery Disease Father         Pacemaker     Hypertension Father      Coronary Artery Disease Brother         Quintuple bypass       Allergies: Ciprofloxacin; Clindamycin; Prednisone; Amoxicillin; Cymbalta; Metronidazole; Seasonal allergies; and Sulfamethoxazole w/trimethoprim    Current Outpatient Medications   Medication Sig Dispense Refill     ALPRAZolam (XANAX) 0.25 MG tablet Take 0.25 mg by mouth 3 times daily as needed        amitriptyline (ELAVIL) 10 MG tablet 1-2 po qhs       aspirin-acetaminophen-caffeine (EXCEDRIN MIGRAINE) 250-250-65 MG tablet  Take 1 tablet by mouth every 6 hours as needed for headaches       estradiol (ESTRING) 2 MG vaginal ring Place 1 each vaginally every 3 months 1 each 3     estradiol (VAGIFEM) 10 MCG TABS vaginal tablet twice a week        halobetasol (ULTRAVATE) 0.05 % ointment        hydrOXYzine (VISTARIL) 25 MG capsule Take 25 mg by mouth       Ibuprofen (ADVIL PO) Take 600 mg by mouth every 8 hours as needed for moderate pain       Levothyroxine Sodium (SYNTHROID PO) Take 88 mcg by mouth daily 88 mcg x 6 days a weeks 176 mcg 1 day a week       metroNIDAZOLE (METROGEL) 0.75 % vaginal gel Insert 1 Applicator into the vagina every Monday, Wednesday and Saturday for 30 days.       nicotine (NICORETTE) 2 MG gum Take 2 mg by mouth       omeprazole (PRILOSEC) 20 MG DR capsule Take 20 mg by mouth daily        ondansetron (ZOFRAN-ODT) 8 MG ODT tab Take 1 tablet (8 mg) by mouth every 8 hours as needed for nausea 10 tablet 1     VITAMIN D, CHOLECALCIFEROL, PO Take 1,000 Units by mouth daily       ROS:  C: NEGATIVE for fever, chills, WT LOSS  I: NEGATIVE for worrisome rashes, moles or lesions  E: NEGATIVE for vision changes or irritation  E/M: NEGATIVE for ear, mouth and throat problems  R: NEGATIVE for significant cough or SOB  CV: NEGATIVE for chest pain, palpitations or peripheral edema  GI: NEGATIVE for nausea, abdominal pain, heartburn, or change in bowel habits  : NEGATIVE for frequency, dysuria, hematuria, vaginal discharge  M: NEGATIVE for significant arthralgias or myalgia  N: NEGATIVE for weakness, dizziness or paresthesias  E: NEGATIVE for temperature intolerance, skin/hair changes  P: NEGATIVE for changes in mood or affect    EXAM:  Blood pressure 135/85, pulse 74, weight 46.3 kg (102 lb), not currently breastfeeding.   BMI= Body mass index is 18.66 kg/m .  General - pleasant female in no acute distress.  Neck - supple without lymphadenopathy or thyromegaly.  Breast - no nodularity, asymmetry or nipple discharge bilaterally.  Pectoral muscles pronounced d/t thin frame  Abdomen - soft, nontender, nondistended, no hepatosplenomegaly.  Pelvic - EG: normal adult female, BUS: loss of architecture over clitoris and between labia minora/majora bilaterally. The urethral orifice is irritated appearing.  Vagina: atrophic with minimal discharge, Cervix: no lesions or CMT, Uterus: firm, normal sized and nontender, Adnexae: no masses or tenderness.  Rectovaginal - deferred.  Musculoskeletal - no gross deformities.  Neurological - normal strength, sensation, and mental status.    ASSESSMENT/PLAN:  Unexplained weight loss   US Pelvic Complete with Transvaginal          Vulvovaginitis  Yeast Culture          Lichen sclerosus  Clobetasol over clitoral huynh and posterior fourchette.    Bx from 3 yrs ago, neg for LS, but clinically very c/w LS. Consider re biopsy if not improved with this regimen    Atrophic vaginitis  estradiol (ESTRING) 2 MG vaginal ring- so daily dose of estrogen being delivered. Also decreases the amount of discharge which bothers the patient.          Recommend using barrier over the urethral orifice.  D/t the scarring of LS, the atrophic vaginal/urethral tissue is exposed and irritated.      Avoid steroid on vaginal mucosa.    Billie Gar MD, FACOG  [facility]  OB/GYN    5/21/2020  8:12 PM

## 2020-05-21 NOTE — TELEPHONE ENCOUNTER
Central Prior Authorization Team   Phone: [phone]      PA Initiation    Medication: estradiol (ESTRING) 2 MG vaginal ring   Insurance Company: [employer] - Phone [phone] Fax [phone]  Pharmacy Filling the Rx: [facility] - [address] [facility]  Filling Pharmacy Phone: [phone]  Filling Pharmacy Fax:    Start Date: 5/21/2020

## 2020-05-21 NOTE — TELEPHONE ENCOUNTER
Prior Authorization Retail Medication Request    Medication/Dose:  Estradiol 2 mg vaginal ring  Apply one ring vaginally every 3 months  Quantity 1 with 3 refills  ICD code (if different than what is on RX):  N.95 atrophic vaginitis  Previously Tried and Failed:2-2019 stop  Estradiol vaginal tablets made her have iritataing white discharge causing her to have chronic bacteria vaginitis. like symptoms.  Rationale:  estradiol vaginal cream-  The cream  Made her vaginal area redness and sore.  Painful!  Used this 3-10-20    Insurance Name:  [facility]  Insurance ID:  [id number]      Pharmacy Information (if different than what is on RX)  Name:  [facility]  Phone:  [phone]

## 2020-05-25 LAB
Lab: NORMAL
SPECIMEN SOURCE: NORMAL
YEAST SPEC QL CULT: NORMAL

## 2020-05-26 ENCOUNTER — RESULTS ONLY (OUTPATIENT)
Dept: LAB | Age: 60
End: 2020-05-26

## 2020-05-26 LAB
D DIMER PPP FEU-MCNC: <0.3 UG/ML FEU (ref 0–0.5)
TROPONIN I SERPL-MCNC: <0.015 UG/L (ref 0–0.04)

## 2020-05-26 NOTE — TELEPHONE ENCOUNTER
Prior Authorization Approval    Authorization Effective Date: 4/21/2020  Authorization Expiration Date: 5/21/2021  Medication: estradiol (ESTRING) 2 MG vaginal ring-APPROVED  Approved Dose/Quantity:   Reference #:     Insurance Company: [employer] - Phone [phone] Fax [phone]  Expected CoPay:       CoPay Card Available:      Foundation Assistance Needed:    Which Pharmacy is filling the prescription (Not needed for infusion/clinic administered): [facility] - [address] [facility]  Pharmacy Notified: Yes-Left message with approval, process, fill, and notify patient when ready  Patient Notified: No

## 2020-12-14 ENCOUNTER — HEALTH MAINTENANCE LETTER (OUTPATIENT)
Age: 60
End: 2020-12-14

## 2021-10-03 ENCOUNTER — HEALTH MAINTENANCE LETTER (OUTPATIENT)
Age: 61
End: 2021-10-03

## 2022-01-22 ENCOUNTER — HEALTH MAINTENANCE LETTER (OUTPATIENT)
Age: 62
End: 2022-01-22

## 2022-09-04 ENCOUNTER — HEALTH MAINTENANCE LETTER (OUTPATIENT)
Age: 62
End: 2022-09-04

## 2023-04-29 ENCOUNTER — HEALTH MAINTENANCE LETTER (OUTPATIENT)
Age: 63
End: 2023-04-29

## 2023-12-09 ENCOUNTER — HEALTH MAINTENANCE LETTER (OUTPATIENT)
Age: 63
End: 2023-12-09